# Patient Record
Sex: FEMALE | Race: WHITE | NOT HISPANIC OR LATINO | Employment: OTHER | URBAN - METROPOLITAN AREA
[De-identification: names, ages, dates, MRNs, and addresses within clinical notes are randomized per-mention and may not be internally consistent; named-entity substitution may affect disease eponyms.]

---

## 2023-07-12 ENCOUNTER — HOSPITAL ENCOUNTER (EMERGENCY)
Facility: CLINIC | Age: 83
Discharge: HOME | End: 2023-07-12
Attending: EMERGENCY MEDICINE | Admitting: EMERGENCY MEDICINE
Payer: COMMERCIAL

## 2023-07-12 VITALS
OXYGEN SATURATION: 93 % | HEART RATE: 50 BPM | HEIGHT: 65 IN | RESPIRATION RATE: 16 BRPM | BODY MASS INDEX: 30.49 KG/M2 | TEMPERATURE: 97.8 F | SYSTOLIC BLOOD PRESSURE: 175 MMHG | WEIGHT: 183 LBS | DIASTOLIC BLOOD PRESSURE: 92 MMHG

## 2023-07-12 DIAGNOSIS — I10 PRIMARY HYPERTENSION: ICD-10-CM

## 2023-07-12 DIAGNOSIS — R42 LIGHT HEADED: ICD-10-CM

## 2023-07-12 LAB
ANION GAP SERPL CALCULATED.3IONS-SCNC: 8 MMOL/L (ref 7–15)
BASOPHILS # BLD AUTO: 0 10E3/UL (ref 0–0.2)
BASOPHILS NFR BLD AUTO: 1 %
BUN SERPL-MCNC: 22.7 MG/DL (ref 8–23)
CALCIUM SERPL-MCNC: 9.2 MG/DL (ref 8.8–10.2)
CHLORIDE SERPL-SCNC: 108 MMOL/L (ref 98–107)
CREAT SERPL-MCNC: 0.78 MG/DL (ref 0.51–0.95)
DEPRECATED HCO3 PLAS-SCNC: 25 MMOL/L (ref 22–29)
EOSINOPHIL # BLD AUTO: 0.3 10E3/UL (ref 0–0.7)
EOSINOPHIL NFR BLD AUTO: 3 %
ERYTHROCYTE [DISTWIDTH] IN BLOOD BY AUTOMATED COUNT: 13.5 % (ref 10–15)
GFR SERPL CREATININE-BSD FRML MDRD: 76 ML/MIN/1.73M2
GLUCOSE BLDC GLUCOMTR-MCNC: 111 MG/DL (ref 70–99)
GLUCOSE SERPL-MCNC: 107 MG/DL (ref 70–99)
HCT VFR BLD AUTO: 38.8 % (ref 35–47)
HGB BLD-MCNC: 12.8 G/DL (ref 11.7–15.7)
HOLD SPECIMEN: NORMAL
IMM GRANULOCYTES # BLD: 0 10E3/UL
IMM GRANULOCYTES NFR BLD: 0 %
LYMPHOCYTES # BLD AUTO: 3 10E3/UL (ref 0.8–5.3)
LYMPHOCYTES NFR BLD AUTO: 39 %
MCH RBC QN AUTO: 30.4 PG (ref 26.5–33)
MCHC RBC AUTO-ENTMCNC: 33 G/DL (ref 31.5–36.5)
MCV RBC AUTO: 92 FL (ref 78–100)
MONOCYTES # BLD AUTO: 0.7 10E3/UL (ref 0–1.3)
MONOCYTES NFR BLD AUTO: 9 %
NEUTROPHILS # BLD AUTO: 3.6 10E3/UL (ref 1.6–8.3)
NEUTROPHILS NFR BLD AUTO: 48 %
NRBC # BLD AUTO: 0 10E3/UL
NRBC BLD AUTO-RTO: 0 /100
PLATELET # BLD AUTO: 153 10E3/UL (ref 150–450)
POTASSIUM SERPL-SCNC: 3.7 MMOL/L (ref 3.4–5.3)
RBC # BLD AUTO: 4.21 10E6/UL (ref 3.8–5.2)
SODIUM SERPL-SCNC: 141 MMOL/L (ref 136–145)
TROPONIN T SERPL HS-MCNC: 7 NG/L
TROPONIN T SERPL HS-MCNC: 9 NG/L
WBC # BLD AUTO: 7.6 10E3/UL (ref 4–11)

## 2023-07-12 PROCEDURE — 258N000003 HC RX IP 258 OP 636: Performed by: EMERGENCY MEDICINE

## 2023-07-12 PROCEDURE — 85014 HEMATOCRIT: CPT | Performed by: EMERGENCY MEDICINE

## 2023-07-12 PROCEDURE — 82962 GLUCOSE BLOOD TEST: CPT

## 2023-07-12 PROCEDURE — 84484 ASSAY OF TROPONIN QUANT: CPT | Performed by: EMERGENCY MEDICINE

## 2023-07-12 PROCEDURE — 93005 ELECTROCARDIOGRAM TRACING: CPT | Performed by: EMERGENCY MEDICINE

## 2023-07-12 PROCEDURE — 96360 HYDRATION IV INFUSION INIT: CPT | Performed by: EMERGENCY MEDICINE

## 2023-07-12 PROCEDURE — 36415 COLL VENOUS BLD VENIPUNCTURE: CPT | Performed by: EMERGENCY MEDICINE

## 2023-07-12 PROCEDURE — 99284 EMERGENCY DEPT VISIT MOD MDM: CPT | Mod: 25 | Performed by: EMERGENCY MEDICINE

## 2023-07-12 PROCEDURE — 80048 BASIC METABOLIC PNL TOTAL CA: CPT | Performed by: EMERGENCY MEDICINE

## 2023-07-12 PROCEDURE — 93010 ELECTROCARDIOGRAM REPORT: CPT | Performed by: EMERGENCY MEDICINE

## 2023-07-12 RX ADMIN — SODIUM CHLORIDE 500 ML: 9 INJECTION, SOLUTION INTRAVENOUS at 05:15

## 2023-07-12 ASSESSMENT — ACTIVITIES OF DAILY LIVING (ADL): ADLS_ACUITY_SCORE: 35

## 2023-07-12 NOTE — ED TRIAGE NOTES
"Pt reported she woke up \"not feeling well, weakness, and thirsty\". Pt took her BP and BP was sbp 200. Pt denied CP, SOB, hx of diabetes, HA, visual changes. Reports has been in good health prior to tonight. Has a hx of HTN and takes Metoprolol 100 mg daily.      Triage Assessment       Row Name 07/12/23 0316       Triage Assessment (Adult)    Airway WDL WDL       Respiratory WDL    Respiratory WDL WDL       Skin Circulation/Temperature WDL    Skin Circulation/Temperature WDL WDL       Cardiac WDL    Cardiac WDL WDL       Peripheral/Neurovascular WDL    Peripheral Neurovascular WDL WDL       Cognitive/Neuro/Behavioral WDL    Cognitive/Neuro/Behavioral WDL WDL                  "

## 2023-07-12 NOTE — DISCHARGE INSTRUCTIONS
Drink smallness of fluid frequently to maintain hydration.  Continue your home medications.  I am not sure what caused your symptoms overnight.  So further testing has all been reassuring.  Please follow-up with your regular doctor or return to the emergency department if you are having chest pain, difficulty breathing, worsening symptoms, or other concerns.

## 2023-07-12 NOTE — ED PROVIDER NOTES
"  History     Chief Complaint   Patient presents with    Hypertension     HPI  Antonia Gordon is a 83 year old female who presents for concerns of elevated blood pressure.  She says that she woke up early this morning and just did not feel right, she could not pinpoint it but just felt slightly lightheaded and weak when she was getting up.  She checked her blood pressure and found to be in the 200/100 range and so came here for further evaluation.  She denies any fever, headache, chest pain, shortness of breath, cough, abdominal pain, nausea, vomiting.  She has not take anything for her symptoms.  She has a history of hypertension.  She says she does not feel dizzy at this time.    Allergies:  Allergies   Allergen Reactions    Cephalexin Itching and Rash    Cephalosporins Rash       Problem List:    There are no problems to display for this patient.       Past Medical History:    Past Medical History:   Diagnosis Date    Hypertension        Past Surgical History:    Past Surgical History:   Procedure Laterality Date    D & C      1991 & 1998    GYN SURGERY      both ovaries removed 1991    ORTHOPEDIC SURGERY      right knee repair 1999       Family History:    No family history on file.    Social History:  Marital Status:   [2]        Medications:    CENTRUM SILVER OR TABS  CLOBETASOL PROPIONATE 0.05 % EX CREA  PREMARIN 0.625 MG/GM VA CREA  TOPROL XL# 100 MG OR TB24          Review of Systems    Physical Exam   BP: (!) 221/109  Pulse: 63  Temp: 97.8  F (36.6  C)  Resp: 18  Height: 165.1 cm (5' 5\")  Weight: 83 kg (183 lb)  SpO2: 96 %      Physical Exam  Vitals and nursing note reviewed.   Constitutional:       General: She is in acute distress.      Appearance: She is well-developed. She is not diaphoretic.   HENT:      Head: Normocephalic and atraumatic.      Right Ear: External ear normal.      Left Ear: External ear normal.      Nose: Nose normal.   Eyes:      General: No scleral icterus.     " Conjunctiva/sclera: Conjunctivae normal.   Cardiovascular:      Rate and Rhythm: Normal rate and regular rhythm.      Heart sounds: No murmur heard.  Pulmonary:      Effort: Pulmonary effort is normal. No respiratory distress.      Breath sounds: No stridor.   Musculoskeletal:      Cervical back: Normal range of motion.   Skin:     General: Skin is warm and dry.   Neurological:      Mental Status: She is alert and oriented to person, place, and time.      GCS: GCS eye subscore is 4. GCS verbal subscore is 5. GCS motor subscore is 6.      Cranial Nerves: Cranial nerves 2-12 are intact.      Coordination: Finger-Nose-Finger Test normal. Rapid alternating movements normal.      Gait: Gait normal.   Psychiatric:         Behavior: Behavior normal.         ED Course                 Procedures              EKG Interpretation:      Interpreted by Brayan Mancilla MD  Time reviewed: 0403  Symptoms at time of EKG: Weakness   Rhythm: sinus   Rate: 54  Axis: Left Axis Deviation  Ectopy: none  Conduction: left anterior fasciclar block  ST Segments/ T Waves: No acute ischemic changes  Q Waves: none  Comparison to prior: Unchanged    Clinical Impression: no acute changes    Critical Care time:  none               Results for orders placed or performed during the hospital encounter of 07/12/23 (from the past 24 hour(s))   Glucose by meter   Result Value Ref Range    GLUCOSE BY METER POCT 111 (H) 70 - 99 mg/dL   Troponin T, High Sensitivity   Result Value Ref Range    Troponin T, High Sensitivity 7 <=14 ng/L   CBC with Platelets & Differential    Narrative    The following orders were created for panel order CBC with Platelets & Differential.  Procedure                               Abnormality         Status                     ---------                               -----------         ------                     CBC with platelets and d...[[id number]]                      Final result                 Please view results for  these tests on the individual orders.   Basic metabolic panel   Result Value Ref Range    Sodium 141 136 - 145 mmol/L    Potassium 3.7 3.4 - 5.3 mmol/L    Chloride 108 (H) 98 - 107 mmol/L    Carbon Dioxide (CO2) 25 22 - 29 mmol/L    Anion Gap 8 7 - 15 mmol/L    Urea Nitrogen 22.7 8.0 - 23.0 mg/dL    Creatinine 0.78 0.51 - 0.95 mg/dL    Calcium 9.2 8.8 - 10.2 mg/dL    Glucose 107 (H) 70 - 99 mg/dL    GFR Estimate 76 >60 mL/min/1.73m2   CBC with platelets and differential   Result Value Ref Range    WBC Count 7.6 4.0 - 11.0 10e3/uL    RBC Count 4.21 3.80 - 5.20 10e6/uL    Hemoglobin 12.8 11.7 - 15.7 g/dL    Hematocrit 38.8 35.0 - 47.0 %    MCV 92 78 - 100 fL    MCH 30.4 26.5 - 33.0 pg    MCHC 33.0 31.5 - 36.5 g/dL    RDW 13.5 10.0 - 15.0 %    Platelet Count 153 150 - 450 10e3/uL    % Neutrophils 48 %    % Lymphocytes 39 %    % Monocytes 9 %    % Eosinophils 3 %    % Basophils 1 %    % Immature Granulocytes 0 %    NRBCs per 100 WBC 0 <1 /100    Absolute Neutrophils 3.6 1.6 - 8.3 10e3/uL    Absolute Lymphocytes 3.0 0.8 - 5.3 10e3/uL    Absolute Monocytes 0.7 0.0 - 1.3 10e3/uL    Absolute Eosinophils 0.3 0.0 - 0.7 10e3/uL    Absolute Basophils 0.0 0.0 - 0.2 10e3/uL    Absolute Immature Granulocytes 0.0 <=0.4 10e3/uL    Absolute NRBCs 0.0 10e3/uL   [facility]    Narrative    The following orders were created for panel order [facility].  Procedure                               Abnormality         Status                     ---------                               -----------         ------                     Extra Blue Top Tube[[id number]]                              Final result                 Please view results for these tests on the individual orders.   Extra Blue Top Tube   Result Value Ref Range    Hold Specimen JIC    Troponin T, High Sensitivity   Result Value Ref Range    Troponin T, High Sensitivity 9 <=14 ng/L       Medications   0.9% sodium chloride BOLUS (0 mLs Intravenous Stopped 7/12/23 0616)        Assessments & Plan (with Medical Decision Making)   83-year-old female who presents with feeling lightheaded and weak.  High risk complaint in this elderly woman.  Unclear cause of her symptoms.  EKG sinus rhythm without signs of ischemia or dysrhythmia.  Electrolytes within normal limits.  Troponin is normal and repeat troponin after several hours is also normal making ACS unlikely.  She has a normal neurologic examination.  Hospitalization considered given the high risk nature of this presentation, however after IV fluids patient said that she was feeling better.  She was ambulating without difficulty and is safe to discharge with reassurance and instructions to check her blood pressure several times over the next 2 to 3 weeks when she is at rest and calm and relaxed.  Write those numbers down and bring them to her primary doctor for further assessment.  The patient is in agreement with this plan.    I have reviewed the nursing notes.    I have reviewed the findings, diagnosis, plan and need for follow up with the patient.           Medical Decision Making  The patient's presentation was of high complexity (an acute health issue posing potential threat to life or bodily function).    The patient's evaluation involved:  ordering and/or review of 3+ test(s) in this encounter (see separate area of note for details)    The patient's management necessitated high risk (a decision regarding hospitalization).        Discharge Medication List as of 7/12/2023  6:16 AM          Final diagnoses:   Primary hypertension   Light headed       7/12/2023   [facility] EMERGENCY DEPT       Brayan Mancilla MD  07/12/23 0726

## 2023-07-23 ENCOUNTER — HOSPITAL ENCOUNTER (EMERGENCY)
Facility: CLINIC | Age: 83
Discharge: HOME | End: 2023-07-23
Attending: EMERGENCY MEDICINE | Admitting: EMERGENCY MEDICINE
Payer: COMMERCIAL

## 2023-07-23 ENCOUNTER — APPOINTMENT (OUTPATIENT)
Dept: CT IMAGING | Facility: CLINIC | Age: 83
End: 2023-07-23
Attending: EMERGENCY MEDICINE
Payer: COMMERCIAL

## 2023-07-23 VITALS
RESPIRATION RATE: 17 BRPM | WEIGHT: 182 LBS | HEART RATE: 53 BPM | BODY MASS INDEX: 30.32 KG/M2 | HEIGHT: 65 IN | OXYGEN SATURATION: 98 % | TEMPERATURE: 97.5 F | DIASTOLIC BLOOD PRESSURE: 99 MMHG | SYSTOLIC BLOOD PRESSURE: 180 MMHG

## 2023-07-23 DIAGNOSIS — R07.89 CHEST DISCOMFORT: ICD-10-CM

## 2023-07-23 DIAGNOSIS — I10 PRIMARY HYPERTENSION: ICD-10-CM

## 2023-07-23 DIAGNOSIS — R00.1 SINUS BRADYCARDIA: ICD-10-CM

## 2023-07-23 LAB
ALBUMIN SERPL BCG-MCNC: 3.9 G/DL (ref 3.5–5.2)
ALP SERPL-CCNC: 82 U/L (ref 35–104)
ALT SERPL W P-5'-P-CCNC: 20 U/L (ref 0–50)
ANION GAP SERPL CALCULATED.3IONS-SCNC: 11 MMOL/L (ref 7–15)
AST SERPL W P-5'-P-CCNC: 18 U/L (ref 0–45)
BASOPHILS # BLD AUTO: 0.1 10E3/UL (ref 0–0.2)
BASOPHILS NFR BLD AUTO: 1 %
BILIRUB SERPL-MCNC: 0.5 MG/DL
BUN SERPL-MCNC: 20.3 MG/DL (ref 8–23)
CALCIUM SERPL-MCNC: 9.3 MG/DL (ref 8.8–10.2)
CHLORIDE SERPL-SCNC: 106 MMOL/L (ref 98–107)
CREAT SERPL-MCNC: 0.89 MG/DL (ref 0.51–0.95)
DEPRECATED HCO3 PLAS-SCNC: 24 MMOL/L (ref 22–29)
EOSINOPHIL # BLD AUTO: 0.3 10E3/UL (ref 0–0.7)
EOSINOPHIL NFR BLD AUTO: 4 %
ERYTHROCYTE [DISTWIDTH] IN BLOOD BY AUTOMATED COUNT: 13.6 % (ref 10–15)
GFR SERPL CREATININE-BSD FRML MDRD: 65 ML/MIN/1.73M2
GLUCOSE SERPL-MCNC: 106 MG/DL (ref 70–99)
HCT VFR BLD AUTO: 43.2 % (ref 35–47)
HGB BLD-MCNC: 14 G/DL (ref 11.7–15.7)
HOLD SPECIMEN: NORMAL
IMM GRANULOCYTES # BLD: 0 10E3/UL
IMM GRANULOCYTES NFR BLD: 0 %
LYMPHOCYTES # BLD AUTO: 3.9 10E3/UL (ref 0.8–5.3)
LYMPHOCYTES NFR BLD AUTO: 44 %
MCH RBC QN AUTO: 30.5 PG (ref 26.5–33)
MCHC RBC AUTO-ENTMCNC: 32.4 G/DL (ref 31.5–36.5)
MCV RBC AUTO: 94 FL (ref 78–100)
MONOCYTES # BLD AUTO: 0.8 10E3/UL (ref 0–1.3)
MONOCYTES NFR BLD AUTO: 10 %
NEUTROPHILS # BLD AUTO: 3.6 10E3/UL (ref 1.6–8.3)
NEUTROPHILS NFR BLD AUTO: 41 %
NRBC # BLD AUTO: 0 10E3/UL
NRBC BLD AUTO-RTO: 0 /100
PLATELET # BLD AUTO: 169 10E3/UL (ref 150–450)
POTASSIUM SERPL-SCNC: 4.3 MMOL/L (ref 3.4–5.3)
PROT SERPL-MCNC: 6.7 G/DL (ref 6.4–8.3)
RBC # BLD AUTO: 4.59 10E6/UL (ref 3.8–5.2)
SODIUM SERPL-SCNC: 141 MMOL/L (ref 136–145)
TROPONIN T SERPL HS-MCNC: 6 NG/L
TROPONIN T SERPL HS-MCNC: 6 NG/L
WBC # BLD AUTO: 8.7 10E3/UL (ref 4–11)

## 2023-07-23 PROCEDURE — 85025 COMPLETE CBC W/AUTO DIFF WBC: CPT | Performed by: EMERGENCY MEDICINE

## 2023-07-23 PROCEDURE — 250N000011 HC RX IP 250 OP 636: Performed by: EMERGENCY MEDICINE

## 2023-07-23 PROCEDURE — 99284 EMERGENCY DEPT VISIT MOD MDM: CPT | Mod: 25 | Performed by: EMERGENCY MEDICINE

## 2023-07-23 PROCEDURE — 93010 ELECTROCARDIOGRAM REPORT: CPT | Performed by: EMERGENCY MEDICINE

## 2023-07-23 PROCEDURE — 36415 COLL VENOUS BLD VENIPUNCTURE: CPT | Performed by: EMERGENCY MEDICINE

## 2023-07-23 PROCEDURE — 93005 ELECTROCARDIOGRAM TRACING: CPT | Performed by: EMERGENCY MEDICINE

## 2023-07-23 PROCEDURE — 250N000013 HC RX MED GY IP 250 OP 250 PS 637: Performed by: EMERGENCY MEDICINE

## 2023-07-23 PROCEDURE — 80053 COMPREHEN METABOLIC PANEL: CPT | Performed by: EMERGENCY MEDICINE

## 2023-07-23 PROCEDURE — 96360 HYDRATION IV INFUSION INIT: CPT | Mod: 59 | Performed by: EMERGENCY MEDICINE

## 2023-07-23 PROCEDURE — 258N000003 HC RX IP 258 OP 636: Performed by: EMERGENCY MEDICINE

## 2023-07-23 PROCEDURE — 84484 ASSAY OF TROPONIN QUANT: CPT | Performed by: EMERGENCY MEDICINE

## 2023-07-23 PROCEDURE — 74177 CT ABD & PELVIS W/CONTRAST: CPT

## 2023-07-23 PROCEDURE — 96361 HYDRATE IV INFUSION ADD-ON: CPT | Performed by: EMERGENCY MEDICINE

## 2023-07-23 PROCEDURE — 84484 ASSAY OF TROPONIN QUANT: CPT | Mod: 91 | Performed by: EMERGENCY MEDICINE

## 2023-07-23 PROCEDURE — 99285 EMERGENCY DEPT VISIT HI MDM: CPT | Mod: 25 | Performed by: EMERGENCY MEDICINE

## 2023-07-23 PROCEDURE — 250N000009 HC RX 250: Performed by: EMERGENCY MEDICINE

## 2023-07-23 RX ORDER — IOPAMIDOL 755 MG/ML
72 INJECTION, SOLUTION INTRAVASCULAR ONCE
Status: COMPLETED | OUTPATIENT
Start: 2023-07-23 | End: 2023-07-23

## 2023-07-23 RX ORDER — LOSARTAN POTASSIUM 25 MG/1
25 TABLET ORAL ONCE
Status: COMPLETED | OUTPATIENT
Start: 2023-07-23 | End: 2023-07-23

## 2023-07-23 RX ADMIN — SODIUM CHLORIDE 500 ML: 9 INJECTION, SOLUTION INTRAVENOUS at 06:50

## 2023-07-23 RX ADMIN — SODIUM CHLORIDE 80 ML: 9 INJECTION, SOLUTION INTRAVENOUS at 06:35

## 2023-07-23 RX ADMIN — IOPAMIDOL 72 ML: 755 INJECTION, SOLUTION INTRAVENOUS at 06:36

## 2023-07-23 RX ADMIN — LOSARTAN POTASSIUM 25 MG: 25 TABLET, FILM COATED ORAL at 08:11

## 2023-07-23 ASSESSMENT — ACTIVITIES OF DAILY LIVING (ADL): ADLS_ACUITY_SCORE: 35

## 2023-07-23 ASSESSMENT — ENCOUNTER SYMPTOMS
HEMATOLOGIC/LYMPHATIC NEGATIVE: 1
CONSTITUTIONAL NEGATIVE: 1
MUSCULOSKELETAL NEGATIVE: 1
ALLERGIC/IMMUNOLOGIC NEGATIVE: 1
PSYCHIATRIC NEGATIVE: 1
EYES NEGATIVE: 1
ENDOCRINE NEGATIVE: 1
CHEST TIGHTNESS: 1
NEUROLOGICAL NEGATIVE: 1
GASTROINTESTINAL NEGATIVE: 1

## 2023-07-23 NOTE — DISCHARGE INSTRUCTIONS
1) Your evaluation today did not reveal any emergency condition or diagnosis based on symptoms you have reported prior to arrival.  Imaging did not show any vascular emergency as discussed and reviewed.  During your care blood work was reassuring without evidence of a heart attack.    2) We have reviewed current blood pressure medication plan and the need for follow-up care with your care team in 1 week for discussion about adjustments are required including timing of measuring her blood pressure.    3) Although you appear stable for discharge to home with plan for ongoing care as an outpatient.  If you develop new symptoms of concern as discussed and reviewed you should return to be reevaluated.

## 2023-07-23 NOTE — ED PROVIDER NOTES
History     Chief Complaint   Patient presents with    Hypertension    Chest Pain     HPI  Antonia Gordon is a 83 year old female who presents with report of chest discomfort after waking this morning.  On intake patient noted that her pain was about the left chest rating down the left shoulder.  She reports a history of hypertension.    Medical records show she had been evaluated on July 12, 2023 with lightheadedness and concerned about her blood pressure.  She was subsequently followed up in clinic on 7/12/2023 and on 7/19/2023.    On examination patient was accompanied by her spouse Jose Luis from home in [place].  She reports she recently had right cataract surgery 2 days ago which went well.  She confirmed her visit 11 days ago and noted that her losartan was increased from 12.5 mg daily to 25 mg daily over the last 4 days after starting it after her ED visit on 7/12/2023.  Has been on metoprolol 100 mg daily for quite some time.  She reported that she woke up at 4 AM this morning with left-sided chest pain rating down the left arm.  She describes it as a discomfort and aching twinging feeling unwell.  She admits that she checks her blood pressure every morning has been checking it more frequently since her blood pressure has been noted to be high.  She reports no back pain or flank pain.  No headache and no visual symptoms.  Due to her symptoms and recent blood pressure concerns she presents by car for further assessment and care.    Allergies:  Allergies   Allergen Reactions    Cephalexin Itching and Rash    Cephalosporins Rash       Problem List:    There are no problems to display for this patient.       Past Medical History:    Past Medical History:   Diagnosis Date    Hypertension        Past Surgical History:    Past Surgical History:   Procedure Laterality Date    D & C      1991 & 1998    GYN SURGERY      both ovaries removed 1991    ORTHOPEDIC SURGERY      right knee repair 1999       Family  "History:    No family history on file.    Social History:  Marital Status:   [2]        Medications:    CENTRUM SILVER OR TABS  CLOBETASOL PROPIONATE 0.05 % EX CREA  PREMARIN 0.625 MG/GM VA CREA  TOPROL XL# 100 MG OR TB24          Review of Systems   Constitutional: Negative.    HENT: Negative.     Eyes: Negative.    Respiratory:  Positive for chest tightness.    Cardiovascular:  Positive for chest pain.   Gastrointestinal: Negative.    Endocrine: Negative.    Genitourinary: Negative.    Musculoskeletal: Negative.    Skin: Negative.    Allergic/Immunologic: Negative.    Neurological: Negative.    Hematological: Negative.    Psychiatric/Behavioral: Negative.     All other systems reviewed and are negative.      Physical Exam   BP: (!) 202/88  Pulse: 56  Temp: 97.5  F (36.4  C)  Resp: 18  Height: 165.1 cm (5' 5\")  Weight: 82.6 kg (182 lb)  SpO2: 98 %      Physical Exam  HENT:      Head: Normocephalic and atraumatic.   Eyes:      Extraocular Movements: Extraocular movements intact.      Pupils: Pupils are equal, round, and reactive to light.   Cardiovascular:      Rate and Rhythm: Normal rate and regular rhythm.      Heart sounds:      No systolic murmur is present.   Chest:      Chest wall: No mass, deformity, tenderness, crepitus or edema. There is no dullness to percussion.   Abdominal:      General: There is no abdominal bruit.      Palpations: Abdomen is soft. There is no fluid wave, splenomegaly or mass.      Tenderness: There is no abdominal tenderness. There is no guarding or rebound.   Musculoskeletal:         General: Normal range of motion.      Cervical back: Normal range of motion and neck supple.   Skin:     Capillary Refill: Capillary refill takes less than 2 seconds.      Coloration: Skin is not cyanotic or pale.      Findings: No ecchymosis, erythema or rash.      Nails: There is no clubbing.   Neurological:      General: No focal deficit present.      Mental Status: She is alert and oriented to " person, place, and time.   Psychiatric:         Mood and Affect: Mood normal. Mood is not anxious.         Behavior: Behavior is agitated.         ED Course                 Procedures              EKG Interpretation:      Interpreted by Fredis Clark MD  Time reviewed: 0604  Symptoms at time of EKG: left sided chest discomfort   Rhythm: sinus bradycardia  Rate: Bradycardia  Axis: Left Axis Deviation  Ectopy: none  Conduction: left anterior fasciclar block  ST Segments/ T Waves: Non-specific ST-T wave changes  Q Waves: nonspecific  Comparison to prior: When compared with EKG dated 7/12/2023 no acute changes noted.-    Clinical Impression: no acute changes        Critical Care time:  was 30 minutes for this patient excluding procedures.             ED medications:  Medications   0.9% sodium chloride BOLUS (500 mLs Intravenous $New Bag 7/23/23 0650)   iopamidol (ISOVUE-370) solution 72 mL (72 mLs Intravenous $Given 7/23/23 0636)   sodium chloride 0.9 % bag 500mL for CT scan flush use (80 mLs Intravenous $Given 7/23/23 0635)   losartan (COZAAR) tablet 25 mg (25 mg Oral $Given 7/23/23 0811)          ED Vitals:  Vitals:    07/23/23 0700 07/23/23 0730 07/23/23 0800 07/23/23 0815   BP: (!) 172/79 (!) 164/83 (!) 167/79 (!) 172/90   Pulse: 54 52 53 57   Resp: 10 19 (!) 8 12   Temp:       TempSrc:       SpO2: 96% 98% 97% 96%   Weight:       Height:         Vitals:    07/23/23 0730 07/23/23 0800 07/23/23 0815 07/23/23 0840   BP: (!) 164/83 (!) 167/79 (!) 172/90 (!) 180/99   Pulse: 52 53 57 53   Resp: 19 (!) 8 12 17   Temp:       TempSrc:       SpO2: 98% 97% 96% 98%   Weight:       Height:          ED labs and imaging:  Results for orders placed or performed during the hospital encounter of 07/23/23 (from the past 24 hour(s))   [place] Draw    Narrative    The following orders were created for panel order [place] Draw.  Procedure                               Abnormality         Status                     ---------                                -----------         ------                     Extra Blue Top Tube[[id number]]                              Final result               Extra Red Top Tube[[id number]]                               Final result               Extra Green Top (Lithium...[[id number]]                      Final result               Extra Purple Top Tube[[id number]]                            Final result                 Please view results for these tests on the individual orders.   Extra Blue Top Tube   Result Value Ref Range    Hold Specimen JIC    Extra Red Top Tube   Result Value Ref Range    Hold Specimen JIC    Extra Green Top (Lithium Heparin) Tube   Result Value Ref Range    Hold Specimen JIC    Extra Purple Top Tube   Result Value Ref Range    Hold Specimen JIC    CBC with platelets differential    Narrative    The following orders were created for panel order CBC with platelets differential.  Procedure                               Abnormality         Status                     ---------                               -----------         ------                     CBC with platelets and d...[[id number]]                      Final result                 Please view results for these tests on the individual orders.   Comprehensive metabolic panel   Result Value Ref Range    Sodium 141 136 - 145 mmol/L    Potassium 4.3 3.4 - 5.3 mmol/L    Chloride 106 98 - 107 mmol/L    Carbon Dioxide (CO2) 24 22 - 29 mmol/L    Anion Gap 11 7 - 15 mmol/L    Urea Nitrogen 20.3 8.0 - 23.0 mg/dL    Creatinine 0.89 0.51 - 0.95 mg/dL    Calcium 9.3 8.8 - 10.2 mg/dL    Glucose 106 (H) 70 - 99 mg/dL    Alkaline Phosphatase 82 35 - 104 U/L    AST 18 0 - 45 U/L    ALT 20 0 - 50 U/L    Protein Total 6.7 6.4 - 8.3 g/dL    Albumin 3.9 3.5 - 5.2 g/dL    Bilirubin Total 0.5 <=1.2 mg/dL    GFR Estimate 65 >60 mL/min/1.73m2   Troponin T, High Sensitivity   Result Value Ref Range    Troponin T, High Sensitivity 6 <=14 ng/L   CBC with platelets  and differential   Result Value Ref Range    WBC Count 8.7 4.0 - 11.0 10e3/uL    RBC Count 4.59 3.80 - 5.20 10e6/uL    Hemoglobin 14.0 11.7 - 15.7 g/dL    Hematocrit 43.2 35.0 - 47.0 %    MCV 94 78 - 100 fL    MCH 30.5 26.5 - 33.0 pg    MCHC 32.4 31.5 - 36.5 g/dL    RDW 13.6 10.0 - 15.0 %    Platelet Count 169 150 - 450 10e3/uL    % Neutrophils 41 %    % Lymphocytes 44 %    % Monocytes 10 %    % Eosinophils 4 %    % Basophils 1 %    % Immature Granulocytes 0 %    NRBCs per 100 WBC 0 <1 /100    Absolute Neutrophils 3.6 1.6 - 8.3 10e3/uL    Absolute Lymphocytes 3.9 0.8 - 5.3 10e3/uL    Absolute Monocytes 0.8 0.0 - 1.3 10e3/uL    Absolute Eosinophils 0.3 0.0 - 0.7 10e3/uL    Absolute Basophils 0.1 0.0 - 0.2 10e3/uL    Absolute Immature Granulocytes 0.0 <=0.4 10e3/uL    Absolute NRBCs 0.0 10e3/uL   CT Aortic Survey w Contrast    Narrative    EXAM: CT AORTIC SURVEY W CONTRAST  LOCATION: [facility]  DATE: 7/23/2023    INDICATION: Advanced age, uncontrolled hypertension, acute left sided chest pain, feeling unwell. Evaluate for aortic dissection versus other acute vascular process  COMPARISON: None.  TECHNIQUE: CT angiogram chest abdomen pelvis during arterial phase of injection of IV contrast. 2D and 3D MIP reconstructions were performed by the CT technologist. Dose reduction techniques were used.   CONTRAST: 72 mL Isovue 370    FINDINGS:   CT ANGIOGRAM CHEST, ABDOMEN, AND PELVIS: No aortic wall hematoma, aneurysm or dissection. Celiac, SMA, single left/2 right renal arteries, FREDDY and bilateral iliofemoral arteries are patent with only minimal atherosclerotic calcification in the infrarenal   abdominal aorta. No incidental pulmonary embolus identified. Replaced right hepatic artery noted arising from the SMA.    LUNGS AND PLEURA: Scattered mild atelectasis in the lung bases. No consolidation, pleural effusion, pneumothorax or suspicious pulmonary nodules.    MEDIASTINUM/AXILLAE: No  intrathoracic, axillary or supraclavicular lymphadenopathy. Heart size is normal without pericardial effusion. A small hiatal hernia is present.    CORONARY ARTERY CALCIFICATION: Mild atherosclerotic calcification in the left anterior descending and right coronary arteries.    HEPATOBILIARY: Normal.    PANCREAS: Normal.    SPLEEN: Normal.    ADRENAL GLANDS: Normal.    KIDNEYS/BLADDER: Normal.    BOWEL: A few scattered sigmoid diverticula are present without acute sigmoid diverticulitis. Multiple tiny diverticula are also seen in the jejunum in the left hemiabdomen, with mild fat stranding in the left abdominal mesentery in the vicinity of these   diverticula, suspicious for mild jejunal diverticulitis (series 9 image 75). No adjacent mesenteric fluid collections. Appendix is normal. No bowel obstruction.    LYMPH NODES: No lymphadenopathy by size criteria. Multiple subcentimeter lymph nodes in the left hemiabdomen are likely reactive.    PELVIC ORGANS: Normal.    MUSCULOSKELETAL: Mild multilevel degenerative disc space narrowing in the mid and lower thoracic spine and minimal degenerative anterolisthesis of L4 on L5. No suspicious osseous lesions or acute fractures.        Impression    IMPRESSION:    1.  No acute vascular abnormalities including aortic wall hematoma, aneurysm or dissection.    2.  Mild, uncomplicated jejunal diverticulitis in the left mid abdomen.    3.  Mild coronary artery atherosclerotic calcifications.    4.  Small hiatal hernia and mild sigmoid diverticulosis.     Troponin T, High Sensitivity   Result Value Ref Range    Troponin T, High Sensitivity 6 <=14 ng/L       Assessments & Plan (with Medical Decision Making)   Assessment Summary and Clinical Impression: 83-year-old female with a history of hypertension presenting with sudden onset of chest discomfort rating to the left arm after awakening by report on arrival.  Patient was captured to have a blood pressure of 202/88.  Patient was  evaluated 11 days prior with hypertension and lightheadedness.  Records show she has been prescribed metoprolol 100 mg daily, recently on 25 mg daily of losartan.  Arrival EKG revealed sinus bradycardia which is unchanged from prior evaluation in the last 11 days.  Office visit for ED follow-up for hypertension with initiation of losartan and a recent dose increase in the last 4 to 5 days.  On exam she reported discomfort in the left chest after waking from sleep at 4 AM.  She would not describe it as a pain or pressure but reported to radiate down her left arm.  She reported some neck ache.  She admitted that she had been checking her blood pressure more consistently during the mornings and that she takes her antihypertensives in the morning.  She did not take her medications prior to ED arrival.  On examination blood pressure was 171/78.  She was resting comfortably and had no neurologic deficits.  We discussed my suspicion about uncontrolled hypertension however imaging and work-up was undertaken to exclude emergent causes that would explain her chest discomfort and symptoms reported on arrival.  CT aortic survey revealed no acute aortic or vascular emergency or process.  Additional findings were outlined in the radiology report that did not correspond to patient's history on presentation and clinical exam serially.  After period of care, discussion with patient and spouse expressed comfort going home with plan for watchful waiting with low threshold to return for evaluation and follow-up care with primary care team.    ED course and plan:  Reviewed the medical record.  Reviewed ED visit on 7/12/2023-improved with IV fluids but no therapies.  Outpatient follow-up was advised.  Office visit on 7/12 and 7/18/2023.  Work-up initiated on arrival revealed a normal troponin-obtained within 2 hours of symptom onset as reported, follow-up troponin from arrival remained normal. Normal hemogram.  Records show patient had  normal TSH on 5/23/2023.  Normal troponin and renal function 7/12/2023.  She was monitored with frequent vital signs on telemetry.  CT aortic survey revealed no acute vascular abnormality.  Radiology noted suspicion for mild uncomplicated jejunal diverticulitis in the left mid abdomen.  Mild coronary artery atherosclerotic calcifications were noted and a small hiatal hernia.  See additional details outlined in the radiology report. Patient reported no abdominal pain and suspicion about acute diverticulitis on serial clinical exam is low during  ED course, No empiric treatment for diverticulitis initiated.  She was offered her home dose of losartan during her ED course her metoprolol was held given resting sinus bradycardia although not new.  We discussed that the exact cause of her symptoms prearrival is not clear and that her work-up did not reveal an emergency condition or diagnosis.  We discussed follow-up care with upcoming visit in the clinic for medication management and recheck on 7/21/2023.  We discussed that if she develops new concerns including recurrence of chest pain that persists or additional symptoms she should return to be reevaluated.  Patient and spouse present during the ED course expressed understanding and agreement with plan of care.      Disclaimer: This note consists of symbols derived from keyboarding, dictation and/or voice recognition software. As a result, there may be errors in the script that have gone undetected. Please consider this when interpreting information found in this chart.   I have reviewed the nursing notes.    I have reviewed the findings, diagnosis, plan and need for follow up with the patient.           Medical Decision Making  The patient's presentation was of moderate complexity (an acute illness with systemic symptoms).    The patient's evaluation involved:  ordering and/or review of 2 test(s) in this encounter (diagnostic imaging and labs)    The patient's  management necessitated moderate risk (prescription drug management including medications given in the ED).        New Prescriptions    No medications on file       Final diagnoses:   Sinus bradycardia   Primary hypertension   Chest discomfort       7/23/2023   [facility] EMERGENCY DEPT       Fredis Clark MD  07/23/23 7028

## 2023-07-23 NOTE — ED TRIAGE NOTES
Pt presents with HTN and pain to left chest and up into left shoulder that started this am after waking.      Triage Assessment       Row Name 07/23/23 0536       Triage Assessment (Adult)    Airway WDL WDL       Respiratory WDL    Respiratory WDL WDL       Skin Circulation/Temperature WDL    Skin Circulation/Temperature WDL WDL       Cardiac WDL    Cardiac WDL X;chest pain       Chest Pain Assessment    Chest Pain Location anterior chest, left;shoulder, left    Chest Pain Radiation arm;back    Character aching;spasm    Chest Pain Intervention cardiac biomarkers drawn;cardiac monitoring continued;cardiac monitor placed;12-lead ECG obtained       Peripheral/Neurovascular WDL    Peripheral Neurovascular WDL WDL       Cognitive/Neuro/Behavioral WDL    Cognitive/Neuro/Behavioral WDL WDL

## 2023-08-29 ENCOUNTER — OFFICE VISIT (OUTPATIENT)
Dept: URGENT CARE | Facility: CLINIC | Age: 83
End: 2023-08-29
Payer: COMMERCIAL

## 2023-08-29 VITALS
HEART RATE: 81 BPM | SYSTOLIC BLOOD PRESSURE: 138 MMHG | OXYGEN SATURATION: 95 % | RESPIRATION RATE: 16 BRPM | DIASTOLIC BLOOD PRESSURE: 82 MMHG

## 2023-08-29 DIAGNOSIS — R30.0 DYSURIA: Primary | ICD-10-CM

## 2023-08-29 DIAGNOSIS — N30.00 ACUTE CYSTITIS WITHOUT HEMATURIA: ICD-10-CM

## 2023-08-29 PROBLEM — J45.901 REACTIVE AIRWAY DISEASE WITH ACUTE EXACERBATION: Status: ACTIVE | Noted: 2023-05-23

## 2023-08-29 PROBLEM — R73.03 PREDIABETES: Status: ACTIVE | Noted: 2022-03-28

## 2023-08-29 LAB
BACTERIA #/AREA URNS HPF: ABNORMAL /HPF
BILIRUB UR QL: NEGATIVE
CLARITY UR: CLEAR
COLOR UR: YELLOW
GLUCOSE UR QL: NEGATIVE MG/DL
HGB UR QL: ABNORMAL
KETONES UR-MCNC: NEGATIVE MG/DL
LEUKOCYTE ESTERASE UR QL STRIP: ABNORMAL
NITRITE UR QL: NEGATIVE
PH UR: 5.5 PH
PROT UR STRIP-MCNC: NEGATIVE MG/DL
RBC #/AREA URNS HPF: ABNORMAL /HPF
SP GR UR: 1.02 (ref 1–1.03)
SQUAMOUS #/AREA URNS HPF: ABNORMAL /HPF
UROBILINOGEN UR-MCNC: 0.2 MG/DL
WBC #/AREA URNS HPF: ABNORMAL /HPF

## 2023-08-29 PROCEDURE — 99203 OFFICE O/P NEW LOW 30 MIN: CPT | Mod: GF | Performed by: NURSE PRACTITIONER

## 2023-08-29 PROCEDURE — G0463 HOSPITAL OUTPT CLINIC VISIT: HCPCS

## 2023-08-29 PROCEDURE — 99202 OFFICE O/P NEW SF 15 MIN: CPT | Mod: GF | Performed by: NURSE PRACTITIONER

## 2023-08-29 PROCEDURE — 87088 URINE BACTERIA CULTURE: CPT | Performed by: NURSE PRACTITIONER

## 2023-08-29 PROCEDURE — 81001 URINALYSIS AUTO W/SCOPE: CPT | Performed by: NURSE PRACTITIONER

## 2023-08-29 RX ORDER — LOSARTAN POTASSIUM 25 MG/1
25 TABLET ORAL DAILY
COMMUNITY
Start: 2023-08-18

## 2023-08-29 RX ORDER — KETOCONAZOLE 20 MG/G
CREAM TOPICAL
COMMUNITY
Start: 2023-05-23

## 2023-08-29 RX ORDER — NITROFURANTOIN 25; 75 MG/1; MG/1
100 CAPSULE ORAL 2 TIMES DAILY
Qty: 10 CAPSULE | Refills: 0 | Status: SHIPPED | OUTPATIENT
Start: 2023-08-29 | End: 2023-09-03

## 2023-08-29 RX ORDER — METOPROLOL SUCCINATE 100 MG/1
100 TABLET, EXTENDED RELEASE ORAL DAILY
COMMUNITY
Start: 2023-06-15

## 2023-08-29 ASSESSMENT — ENCOUNTER SYMPTOMS
GASTROINTESTINAL NEGATIVE: 1
HEMATURIA: 0
FREQUENCY: 1
MUSCULOSKELETAL NEGATIVE: 1
DIFFICULTY URINATING: 0
CONSTITUTIONAL NEGATIVE: 1
RESPIRATORY NEGATIVE: 1
FLANK PAIN: 0
DYSURIA: 0
NEUROLOGICAL NEGATIVE: 1

## 2023-08-29 NOTE — PATIENT INSTRUCTIONS
OK to take over the counter AZO for symptoms. Please return to clinic with any fever, chills, nausea, vomiting, abdominal pain or any other worsening.

## 2023-08-29 NOTE — PROGRESS NOTES
Subjective      Inga Griffin is a 83 y.o. female who presents for urinary frequency and urgency.  No chief complaint on file.        HPI    Inga is an 83-year-old female from [place] who presents to the walk-in clinic with urinary frequency and urgency.  She states that her symptoms started on 8/23/2023.  She states that she has been pushing fluids to relieve her symptoms but they have not gone away.  She denies any fever or chills.  She denies any abdominal or back pain.  She denies any burning with urination.  She denies any nausea, vomiting or diarrhea.  She has not taken any medications for her symptoms.    The following have been reviewed and updated as appropriate in this visit:   Allergies  Meds  Problems  Med Hx  Surg Hx  Fam Hx         Allergies   Allergen Reactions    Cephalosporins Itching and Rash     Other reaction(s): Fever  Other reaction(s): Itching  Upper eye lids were swollen.   Cephalexin.       Current Outpatient Medications   Medication Sig Dispense Refill    losartan (COZAAR) 25 mg tablet Take 1 tablet (25 mg total) by mouth daily      metoprolol succinate XL (TOPROL-XL) 100 mg 24 hr tablet Take 1 tablet (100 mg total) by mouth daily      ketoconazole (NIZORAL) 2 % cream Apply a thin layer to affected skin twice a day as needed for rash in the skin folds.      nitrofurantoin, macrocrystal-monohydrate, (Macrobid) 100 mg capsule Take 1 capsule (100 mg total) by mouth 2 (two) times a day for 5 days 10 capsule 0     No current facility-administered medications for this visit.     History reviewed. No pertinent past medical history.  History reviewed. No pertinent surgical history.  History reviewed. No pertinent family history.  Social History     Socioeconomic History    Marital status:        Review of Systems   Constitutional: Negative.    Respiratory: Negative.     Gastrointestinal: Negative.    Genitourinary:  Positive for decreased urine volume, frequency and urgency.  Negative for difficulty urinating, dysuria, flank pain and hematuria.   Musculoskeletal: Negative.    Skin: Negative.    Neurological: Negative.        Objective   /82   Pulse 81   Resp 16   SpO2 95%     Physical Exam  Constitutional:       Appearance: Normal appearance.   Cardiovascular:      Rate and Rhythm: Normal rate and regular rhythm.      Pulses: Normal pulses.      Heart sounds: Normal heart sounds.   Pulmonary:      Effort: Pulmonary effort is normal.      Breath sounds: Normal breath sounds.   Abdominal:      General: Abdomen is flat. Bowel sounds are normal. There is no distension.      Palpations: Abdomen is soft. There is no mass.      Tenderness: There is no abdominal tenderness. There is no right CVA tenderness, left CVA tenderness, guarding or rebound.      Hernia: No hernia is present.   Musculoskeletal:         General: Normal range of motion.   Skin:     General: Skin is warm and dry.      Capillary Refill: Capillary refill takes less than 2 seconds.   Neurological:      General: No focal deficit present.      Mental Status: She is alert and oriented to person, place, and time.         Assessment/Plan   Diagnoses and all orders for this visit:    Dysuria  -     Urinalysis w/microscopic, reflex culture Urine, Clean Catch  -     Urine culture    Acute cystitis without hematuria  -     nitrofurantoin, macrocrystal-monohydrate, (Macrobid) 100 mg capsule; Take 1 capsule (100 mg total) by mouth 2 (two) times a day for 5 days       Inga is an 83-year-old female from [place] who presents to the walk-in clinic with a 1 week history of urinary frequency and urgency.  She denies any fever, chills, abdominal pain, back pain, nausea or vomiting.  Upon examination she does not exhibit any abdominal tenderness or CVA tenderness.   Work-up in the in clinic included a UA.  This was positive for trace blood, small leukocytes, 3-5 RBCs and 10-14 WBCs.  Urine culture is pending.   Ideally this patient  could be treated with Keflex, however the patient is allergic to cephalosporins.  Patient placed on Macrobid to take 1 capsule by mouth twice a day for the next 5 days.  Encouraged patient to push fluids.  Discussed the patient using OTC AZO for up to 2 days as needed for symptoms.   Instructed patient to return to the clinic or go to the nearest emergency room should she develop any fever, chills, abdominal pain, back pain, nausea, vomiting, diarrhea or any other worsening symptoms or if she has any concerns.  Patient verbalized understanding.    Rustam Carson, CNP

## 2023-08-30 NOTE — RESULT ENCOUNTER NOTE
Please call the patient regarding her urine culture result.    Your urine culture was positive for Klebsiella pneumonia.  The medication I prescribed, Macrobid, does not cover this bacteria.  You will need a new prescription for this bacteria.    Please let us know where you can  a new prescription.  I would recommend starting Augmentin 500 mg twice daily for 5 days, and quit taking the Macrobid.

## 2023-08-31 LAB
BACTERIA UR CULT: ABNORMAL
BACTERIA UR CULT: ABNORMAL

## 2023-10-02 ENCOUNTER — TRANSCRIBE ORDERS (OUTPATIENT)
Dept: OTHER | Age: 83
End: 2023-10-02

## 2023-10-02 DIAGNOSIS — I25.10 CAD, MULTIPLE VESSEL: ICD-10-CM

## 2023-10-02 DIAGNOSIS — R94.39 ABNORMAL STRESS TEST: Primary | ICD-10-CM

## 2023-10-13 ENCOUNTER — TRANSCRIBE ORDERS (OUTPATIENT)
Dept: OTHER | Age: 83
End: 2023-10-13

## 2023-10-17 ENCOUNTER — HOSPITAL ENCOUNTER (OUTPATIENT)
Dept: CARDIAC REHAB | Facility: CLINIC | Age: 83
Discharge: HOME | End: 2023-10-17
Attending: INTERNAL MEDICINE
Payer: COMMERCIAL

## 2023-10-17 PROCEDURE — 93798 PHYS/QHP OP CAR RHAB W/ECG: CPT

## 2023-10-17 PROCEDURE — 93797 PHYS/QHP OP CAR RHAB WO ECG: CPT | Mod: 59

## 2023-10-24 ENCOUNTER — HOSPITAL ENCOUNTER (OUTPATIENT)
Dept: CARDIAC REHAB | Facility: CLINIC | Age: 83
Discharge: HOME | End: 2023-10-24
Attending: INTERNAL MEDICINE
Payer: COMMERCIAL

## 2023-10-24 PROCEDURE — 93798 PHYS/QHP OP CAR RHAB W/ECG: CPT

## 2023-10-26 ENCOUNTER — HOSPITAL ENCOUNTER (OUTPATIENT)
Dept: CARDIAC REHAB | Facility: CLINIC | Age: 83
Discharge: HOME | End: 2023-10-26
Attending: INTERNAL MEDICINE
Payer: COMMERCIAL

## 2023-10-26 PROCEDURE — 93798 PHYS/QHP OP CAR RHAB W/ECG: CPT

## 2023-10-31 ENCOUNTER — HOSPITAL ENCOUNTER (OUTPATIENT)
Dept: CARDIAC REHAB | Facility: CLINIC | Age: 83
Discharge: HOME | End: 2023-10-31
Attending: INTERNAL MEDICINE
Payer: COMMERCIAL

## 2023-10-31 PROCEDURE — 93798 PHYS/QHP OP CAR RHAB W/ECG: CPT

## 2023-12-02 ENCOUNTER — HEALTH MAINTENANCE LETTER (OUTPATIENT)
Age: 83
End: 2023-12-02

## 2025-01-05 ENCOUNTER — HEALTH MAINTENANCE LETTER (OUTPATIENT)
Age: 85
End: 2025-01-05

## 2025-01-20 ENCOUNTER — ANESTHESIA EVENT (OUTPATIENT)
Dept: SURGERY | Facility: CLINIC | Age: 85
End: 2025-01-20
Payer: COMMERCIAL

## 2025-01-22 ENCOUNTER — HOSPITAL ENCOUNTER (OUTPATIENT)
Facility: CLINIC | Age: 85
Discharge: HOME | End: 2025-01-23
Attending: ORTHOPAEDIC SURGERY | Admitting: ORTHOPAEDIC SURGERY
Payer: COMMERCIAL

## 2025-01-22 ENCOUNTER — ANESTHESIA (OUTPATIENT)
Dept: SURGERY | Facility: CLINIC | Age: 85
End: 2025-01-22
Payer: COMMERCIAL

## 2025-01-22 ENCOUNTER — APPOINTMENT (OUTPATIENT)
Dept: GENERAL RADIOLOGY | Facility: CLINIC | Age: 85
End: 2025-01-22
Attending: ORTHOPAEDIC SURGERY
Payer: COMMERCIAL

## 2025-01-22 DIAGNOSIS — Z96.652 H/O TOTAL KNEE REPLACEMENT, LEFT: Primary | ICD-10-CM

## 2025-01-22 PROBLEM — M17.12 LEFT KNEE DJD: Status: ACTIVE | Noted: 2025-01-22

## 2025-01-22 LAB
CREAT SERPL-MCNC: 0.85 MG/DL (ref 0.51–0.95)
EGFRCR SERPLBLD CKD-EPI 2021: 68 ML/MIN/1.73M2
ERYTHROCYTE [DISTWIDTH] IN BLOOD BY AUTOMATED COUNT: 13.2 % (ref 10–15)
GLUCOSE BLDC GLUCOMTR-MCNC: 99 MG/DL (ref 70–99)
HCT VFR BLD AUTO: 39 % (ref 35–47)
HGB BLD-MCNC: 13.2 G/DL (ref 11.7–15.7)
MCH RBC QN AUTO: 30.6 PG (ref 26.5–33)
MCHC RBC AUTO-ENTMCNC: 33.8 G/DL (ref 31.5–36.5)
MCV RBC AUTO: 91 FL (ref 78–100)
PLATELET # BLD AUTO: 162 10E3/UL (ref 150–450)
POTASSIUM SERPL-SCNC: 4.1 MMOL/L (ref 3.4–5.3)
RBC # BLD AUTO: 4.31 10E6/UL (ref 3.8–5.2)
WBC # BLD AUTO: 7.8 10E3/UL (ref 4–11)

## 2025-01-22 PROCEDURE — 250N000013 HC RX MED GY IP 250 OP 250 PS 637

## 2025-01-22 PROCEDURE — 82962 GLUCOSE BLOOD TEST: CPT

## 2025-01-22 PROCEDURE — 82565 ASSAY OF CREATININE: CPT | Performed by: PHYSICIAN ASSISTANT

## 2025-01-22 PROCEDURE — 250N000013 HC RX MED GY IP 250 OP 250 PS 637: Performed by: ORTHOPAEDIC SURGERY

## 2025-01-22 PROCEDURE — 999N000065 XR KNEE PORT LEFT 1/2 VIEWS: Mod: LT

## 2025-01-22 PROCEDURE — C1776 JOINT DEVICE (IMPLANTABLE): HCPCS | Performed by: ORTHOPAEDIC SURGERY

## 2025-01-22 PROCEDURE — 250N000013 HC RX MED GY IP 250 OP 250 PS 637: Performed by: PHYSICIAN ASSISTANT

## 2025-01-22 PROCEDURE — 85018 HEMOGLOBIN: CPT | Performed by: PHYSICIAN ASSISTANT

## 2025-01-22 PROCEDURE — 370N000017 HC ANESTHESIA TECHNICAL FEE, PER MIN: Performed by: ORTHOPAEDIC SURGERY

## 2025-01-22 PROCEDURE — 999N000141 HC STATISTIC PRE-PROCEDURE NURSING ASSESSMENT: Performed by: ORTHOPAEDIC SURGERY

## 2025-01-22 PROCEDURE — 710N000009 HC RECOVERY PHASE 1, LEVEL 1, PER MIN: Performed by: ORTHOPAEDIC SURGERY

## 2025-01-22 PROCEDURE — 84132 ASSAY OF SERUM POTASSIUM: CPT | Performed by: PHYSICIAN ASSISTANT

## 2025-01-22 PROCEDURE — 272N000001 HC OR GENERAL SUPPLY STERILE: Performed by: ORTHOPAEDIC SURGERY

## 2025-01-22 PROCEDURE — 250N000009 HC RX 250: Performed by: NURSE ANESTHETIST, CERTIFIED REGISTERED

## 2025-01-22 PROCEDURE — 271N000001 HC OR GENERAL SUPPLY NON-STERILE: Performed by: ORTHOPAEDIC SURGERY

## 2025-01-22 PROCEDURE — 258N000003 HC RX IP 258 OP 636: Performed by: ORTHOPAEDIC SURGERY

## 2025-01-22 PROCEDURE — 85048 AUTOMATED LEUKOCYTE COUNT: CPT | Performed by: PHYSICIAN ASSISTANT

## 2025-01-22 PROCEDURE — 250N000011 HC RX IP 250 OP 636: Performed by: NURSE ANESTHETIST, CERTIFIED REGISTERED

## 2025-01-22 PROCEDURE — 258N000003 HC RX IP 258 OP 636: Performed by: NURSE ANESTHETIST, CERTIFIED REGISTERED

## 2025-01-22 PROCEDURE — C1713 ANCHOR/SCREW BN/BN,TIS/BN: HCPCS | Performed by: ORTHOPAEDIC SURGERY

## 2025-01-22 PROCEDURE — 360N000077 HC SURGERY LEVEL 4, PER MIN: Performed by: ORTHOPAEDIC SURGERY

## 2025-01-22 PROCEDURE — 258N000001 HC RX 258: Performed by: ORTHOPAEDIC SURGERY

## 2025-01-22 PROCEDURE — 36415 COLL VENOUS BLD VENIPUNCTURE: CPT | Performed by: PHYSICIAN ASSISTANT

## 2025-01-22 PROCEDURE — 250N000011 HC RX IP 250 OP 636: Performed by: PHYSICIAN ASSISTANT

## 2025-01-22 PROCEDURE — 250N000011 HC RX IP 250 OP 636: Performed by: ORTHOPAEDIC SURGERY

## 2025-01-22 DEVICE — CRUCIATE RETAINING FEMORAL
Type: IMPLANTABLE DEVICE | Site: KNEE | Status: FUNCTIONAL
Brand: TRIATHLON

## 2025-01-22 DEVICE — PIN FIXATION SS FLUTE SQUARE L3.5 IN OD1/8 IN 7650-2038A: Type: IMPLANTABLE DEVICE | Site: KNEE | Status: FUNCTIONAL

## 2025-01-22 DEVICE — PRIMARY TIBIAL BASEPLATE
Type: IMPLANTABLE DEVICE | Site: KNEE | Status: FUNCTIONAL
Brand: TRIATHLON

## 2025-01-22 DEVICE — PATELLA
Type: IMPLANTABLE DEVICE | Site: KNEE | Status: FUNCTIONAL
Brand: TRIATHLON

## 2025-01-22 DEVICE — IMP BONE CEMENT STRK SIMPLEX TOBRAMYCIN 40CC 6197-9-001: Type: IMPLANTABLE DEVICE | Site: KNEE | Status: FUNCTIONAL

## 2025-01-22 DEVICE — TIBIAL BEARING INSERT - CS
Type: IMPLANTABLE DEVICE | Site: KNEE | Status: FUNCTIONAL
Brand: TRIATHLON

## 2025-01-22 RX ORDER — KETAMINE HYDROCHLORIDE 10 MG/ML
INJECTION INTRAMUSCULAR; INTRAVENOUS PRN
Status: DISCONTINUED | OUTPATIENT
Start: 2025-01-22 | End: 2025-01-22

## 2025-01-22 RX ORDER — BISACODYL 10 MG
10 SUPPOSITORY, RECTAL RECTAL DAILY PRN
Status: DISCONTINUED | OUTPATIENT
Start: 2025-01-22 | End: 2025-01-23 | Stop reason: HOSPADM

## 2025-01-22 RX ORDER — METHOCARBAMOL 500 MG/1
250 TABLET ORAL EVERY 6 HOURS PRN
Status: DISCONTINUED | OUTPATIENT
Start: 2025-01-22 | End: 2025-01-23 | Stop reason: HOSPADM

## 2025-01-22 RX ORDER — SODIUM CHLORIDE, SODIUM LACTATE, POTASSIUM CHLORIDE, CALCIUM CHLORIDE 600; 310; 30; 20 MG/100ML; MG/100ML; MG/100ML; MG/100ML
INJECTION, SOLUTION INTRAVENOUS CONTINUOUS
Status: DISCONTINUED | OUTPATIENT
Start: 2025-01-22 | End: 2025-01-22 | Stop reason: HOSPADM

## 2025-01-22 RX ORDER — AMOXICILLIN 250 MG
1 CAPSULE ORAL 2 TIMES DAILY
Status: DISCONTINUED | OUTPATIENT
Start: 2025-01-22 | End: 2025-01-23 | Stop reason: HOSPADM

## 2025-01-22 RX ORDER — FENTANYL CITRATE 50 UG/ML
50 INJECTION, SOLUTION INTRAMUSCULAR; INTRAVENOUS EVERY 5 MIN PRN
Status: DISCONTINUED | OUTPATIENT
Start: 2025-01-22 | End: 2025-01-22 | Stop reason: HOSPADM

## 2025-01-22 RX ORDER — LIDOCAINE 40 MG/G
CREAM TOPICAL
Status: DISCONTINUED | OUTPATIENT
Start: 2025-01-22 | End: 2025-01-22 | Stop reason: HOSPADM

## 2025-01-22 RX ORDER — ONDANSETRON 2 MG/ML
4 INJECTION INTRAMUSCULAR; INTRAVENOUS EVERY 30 MIN PRN
Status: DISCONTINUED | OUTPATIENT
Start: 2025-01-22 | End: 2025-01-22 | Stop reason: DRUGHIGH

## 2025-01-22 RX ORDER — METOPROLOL TARTRATE 1 MG/ML
1-2 INJECTION, SOLUTION INTRAVENOUS EVERY 5 MIN PRN
Status: DISCONTINUED | OUTPATIENT
Start: 2025-01-22 | End: 2025-01-22 | Stop reason: HOSPADM

## 2025-01-22 RX ORDER — LOSARTAN POTASSIUM 25 MG/1
25 TABLET ORAL DAILY
COMMUNITY

## 2025-01-22 RX ORDER — ONDANSETRON 2 MG/ML
INJECTION INTRAMUSCULAR; INTRAVENOUS PRN
Status: DISCONTINUED | OUTPATIENT
Start: 2025-01-22 | End: 2025-01-22

## 2025-01-22 RX ORDER — ASPIRIN 81 MG/1
81 TABLET ORAL DAILY
Status: ON HOLD | COMMUNITY
End: 2025-01-23

## 2025-01-22 RX ORDER — DIPHENHYDRAMINE HCL 12.5 MG/5ML
12.5 SOLUTION ORAL EVERY 6 HOURS PRN
Status: DISCONTINUED | OUTPATIENT
Start: 2025-01-22 | End: 2025-01-23 | Stop reason: HOSPADM

## 2025-01-22 RX ORDER — OXYCODONE HYDROCHLORIDE 5 MG/1
5 TABLET ORAL EVERY 4 HOURS PRN
Status: DISCONTINUED | OUTPATIENT
Start: 2025-01-22 | End: 2025-01-23 | Stop reason: HOSPADM

## 2025-01-22 RX ORDER — FENTANYL CITRATE 50 UG/ML
INJECTION, SOLUTION INTRAMUSCULAR; INTRAVENOUS PRN
Status: DISCONTINUED | OUTPATIENT
Start: 2025-01-22 | End: 2025-01-22

## 2025-01-22 RX ORDER — ONDANSETRON 4 MG/1
4 TABLET, ORALLY DISINTEGRATING ORAL EVERY 6 HOURS PRN
Status: DISCONTINUED | OUTPATIENT
Start: 2025-01-22 | End: 2025-01-23 | Stop reason: HOSPADM

## 2025-01-22 RX ORDER — POLYETHYLENE GLYCOL 3350 17 G/17G
17 POWDER, FOR SOLUTION ORAL DAILY
Status: DISCONTINUED | OUTPATIENT
Start: 2025-01-23 | End: 2025-01-23 | Stop reason: HOSPADM

## 2025-01-22 RX ORDER — DEXAMETHASONE SODIUM PHOSPHATE 4 MG/ML
4 INJECTION, SOLUTION INTRA-ARTICULAR; INTRALESIONAL; INTRAMUSCULAR; INTRAVENOUS; SOFT TISSUE
Status: DISCONTINUED | OUTPATIENT
Start: 2025-01-22 | End: 2025-01-22 | Stop reason: HOSPADM

## 2025-01-22 RX ORDER — TRANEXAMIC ACID 650 MG/1
1950 TABLET ORAL ONCE
Status: COMPLETED | OUTPATIENT
Start: 2025-01-22 | End: 2025-01-22

## 2025-01-22 RX ORDER — PROCHLORPERAZINE MALEATE 5 MG/1
5 TABLET ORAL EVERY 6 HOURS PRN
Status: DISCONTINUED | OUTPATIENT
Start: 2025-01-22 | End: 2025-01-23 | Stop reason: HOSPADM

## 2025-01-22 RX ORDER — BUPIVACAINE HYDROCHLORIDE 7.5 MG/ML
INJECTION, SOLUTION INTRASPINAL
Status: COMPLETED | OUTPATIENT
Start: 2025-01-22 | End: 2025-01-22

## 2025-01-22 RX ORDER — ONDANSETRON 4 MG/1
4 TABLET, ORALLY DISINTEGRATING ORAL EVERY 30 MIN PRN
Status: DISCONTINUED | OUTPATIENT
Start: 2025-01-22 | End: 2025-01-22 | Stop reason: DRUGHIGH

## 2025-01-22 RX ORDER — CEFAZOLIN SODIUM/WATER 2 G/20 ML
2 SYRINGE (ML) INTRAVENOUS
Status: COMPLETED | OUTPATIENT
Start: 2025-01-22 | End: 2025-01-22

## 2025-01-22 RX ORDER — ATORVASTATIN CALCIUM 20 MG/1
40 TABLET, FILM COATED ORAL EVERY EVENING
Status: DISCONTINUED | OUTPATIENT
Start: 2025-01-22 | End: 2025-01-23 | Stop reason: HOSPADM

## 2025-01-22 RX ORDER — HYDROMORPHONE HCL IN WATER/PF 6 MG/30 ML
0.4 PATIENT CONTROLLED ANALGESIA SYRINGE INTRAVENOUS
Status: DISCONTINUED | OUTPATIENT
Start: 2025-01-22 | End: 2025-01-23 | Stop reason: HOSPADM

## 2025-01-22 RX ORDER — ONDANSETRON 2 MG/ML
4 INJECTION INTRAMUSCULAR; INTRAVENOUS EVERY 6 HOURS PRN
Status: DISCONTINUED | OUTPATIENT
Start: 2025-01-22 | End: 2025-01-23 | Stop reason: HOSPADM

## 2025-01-22 RX ORDER — HYDROMORPHONE HCL IN WATER/PF 6 MG/30 ML
0.4 PATIENT CONTROLLED ANALGESIA SYRINGE INTRAVENOUS EVERY 5 MIN PRN
Status: DISCONTINUED | OUTPATIENT
Start: 2025-01-22 | End: 2025-01-22 | Stop reason: HOSPADM

## 2025-01-22 RX ORDER — NALOXONE HYDROCHLORIDE 0.4 MG/ML
0.1 INJECTION, SOLUTION INTRAMUSCULAR; INTRAVENOUS; SUBCUTANEOUS
Status: DISCONTINUED | OUTPATIENT
Start: 2025-01-22 | End: 2025-01-23 | Stop reason: HOSPADM

## 2025-01-22 RX ORDER — OXYCODONE HYDROCHLORIDE 5 MG/1
10 TABLET ORAL EVERY 4 HOURS PRN
Status: DISCONTINUED | OUTPATIENT
Start: 2025-01-22 | End: 2025-01-23 | Stop reason: HOSPADM

## 2025-01-22 RX ORDER — OXYCODONE HYDROCHLORIDE 5 MG/1
5 TABLET ORAL
Status: DISCONTINUED | OUTPATIENT
Start: 2025-01-22 | End: 2025-01-22 | Stop reason: DRUGHIGH

## 2025-01-22 RX ORDER — DEXAMETHASONE SODIUM PHOSPHATE 4 MG/ML
INJECTION, SOLUTION INTRA-ARTICULAR; INTRALESIONAL; INTRAMUSCULAR; INTRAVENOUS; SOFT TISSUE PRN
Status: DISCONTINUED | OUTPATIENT
Start: 2025-01-22 | End: 2025-01-22

## 2025-01-22 RX ORDER — ACETAMINOPHEN 325 MG/1
650 TABLET ORAL EVERY 4 HOURS PRN
Qty: 100 TABLET | Refills: 0 | Status: SHIPPED | OUTPATIENT
Start: 2025-01-22

## 2025-01-22 RX ORDER — OXYCODONE HYDROCHLORIDE 5 MG/1
5-10 TABLET ORAL EVERY 4 HOURS PRN
Qty: 30 TABLET | Refills: 0 | Status: SHIPPED | OUTPATIENT
Start: 2025-01-22

## 2025-01-22 RX ORDER — NALOXONE HYDROCHLORIDE 0.4 MG/ML
0.1 INJECTION, SOLUTION INTRAMUSCULAR; INTRAVENOUS; SUBCUTANEOUS
Status: DISCONTINUED | OUTPATIENT
Start: 2025-01-22 | End: 2025-01-22 | Stop reason: HOSPADM

## 2025-01-22 RX ORDER — ONDANSETRON 2 MG/ML
4 INJECTION INTRAMUSCULAR; INTRAVENOUS EVERY 30 MIN PRN
Status: DISCONTINUED | OUTPATIENT
Start: 2025-01-22 | End: 2025-01-22 | Stop reason: HOSPADM

## 2025-01-22 RX ORDER — HYDROMORPHONE HCL IN WATER/PF 6 MG/30 ML
0.2 PATIENT CONTROLLED ANALGESIA SYRINGE INTRAVENOUS
Status: DISCONTINUED | OUTPATIENT
Start: 2025-01-22 | End: 2025-01-23 | Stop reason: HOSPADM

## 2025-01-22 RX ORDER — HYDROXYZINE HYDROCHLORIDE 10 MG/1
10 TABLET, FILM COATED ORAL EVERY 6 HOURS PRN
Status: DISCONTINUED | OUTPATIENT
Start: 2025-01-22 | End: 2025-01-22 | Stop reason: HOSPADM

## 2025-01-22 RX ORDER — ASPIRIN 325 MG
325 TABLET, DELAYED RELEASE (ENTERIC COATED) ORAL DAILY
Status: DISCONTINUED | OUTPATIENT
Start: 2025-01-22 | End: 2025-01-23 | Stop reason: HOSPADM

## 2025-01-22 RX ORDER — CEFAZOLIN SODIUM/WATER 2 G/20 ML
2 SYRINGE (ML) INTRAVENOUS SEE ADMIN INSTRUCTIONS
Status: DISCONTINUED | OUTPATIENT
Start: 2025-01-22 | End: 2025-01-22 | Stop reason: HOSPADM

## 2025-01-22 RX ORDER — FAMOTIDINE 20 MG/1
20 TABLET, FILM COATED ORAL 2 TIMES DAILY
Status: DISCONTINUED | OUTPATIENT
Start: 2025-01-22 | End: 2025-01-23 | Stop reason: HOSPADM

## 2025-01-22 RX ORDER — PROPOFOL 10 MG/ML
INJECTION, EMULSION INTRAVENOUS CONTINUOUS PRN
Status: DISCONTINUED | OUTPATIENT
Start: 2025-01-22 | End: 2025-01-22

## 2025-01-22 RX ORDER — ATORVASTATIN CALCIUM 40 MG/1
40 TABLET, FILM COATED ORAL DAILY
COMMUNITY

## 2025-01-22 RX ORDER — METOPROLOL SUCCINATE 100 MG/1
100 TABLET, EXTENDED RELEASE ORAL DAILY
Status: DISCONTINUED | OUTPATIENT
Start: 2025-01-23 | End: 2025-01-23

## 2025-01-22 RX ORDER — ONDANSETRON 4 MG/1
4 TABLET, ORALLY DISINTEGRATING ORAL EVERY 30 MIN PRN
Status: DISCONTINUED | OUTPATIENT
Start: 2025-01-22 | End: 2025-01-22 | Stop reason: HOSPADM

## 2025-01-22 RX ORDER — ASPIRIN 325 MG
325 TABLET, DELAYED RELEASE (ENTERIC COATED) ORAL DAILY
Status: SHIPPED
Start: 2025-01-22

## 2025-01-22 RX ORDER — SODIUM CHLORIDE, SODIUM LACTATE, POTASSIUM CHLORIDE, CALCIUM CHLORIDE 600; 310; 30; 20 MG/100ML; MG/100ML; MG/100ML; MG/100ML
INJECTION, SOLUTION INTRAVENOUS CONTINUOUS
Status: DISCONTINUED | OUTPATIENT
Start: 2025-01-22 | End: 2025-01-23 | Stop reason: HOSPADM

## 2025-01-22 RX ORDER — ACETAMINOPHEN 325 MG/1
975 TABLET ORAL ONCE
Status: COMPLETED | OUTPATIENT
Start: 2025-01-22 | End: 2025-01-22

## 2025-01-22 RX ORDER — AMOXICILLIN 250 MG
1-2 CAPSULE ORAL 2 TIMES DAILY
Status: SHIPPED
Start: 2025-01-22

## 2025-01-22 RX ORDER — DEXAMETHASONE SODIUM PHOSPHATE 4 MG/ML
4 INJECTION, SOLUTION INTRA-ARTICULAR; INTRALESIONAL; INTRAMUSCULAR; INTRAVENOUS; SOFT TISSUE
Status: DISCONTINUED | OUTPATIENT
Start: 2025-01-22 | End: 2025-01-23 | Stop reason: HOSPADM

## 2025-01-22 RX ORDER — LIDOCAINE 40 MG/G
CREAM TOPICAL
Status: DISCONTINUED | OUTPATIENT
Start: 2025-01-22 | End: 2025-01-23 | Stop reason: HOSPADM

## 2025-01-22 RX ORDER — CEFAZOLIN 2 G/1
2 INJECTION, POWDER, FOR SOLUTION INTRAVENOUS EVERY 8 HOURS
Status: COMPLETED | OUTPATIENT
Start: 2025-01-23 | End: 2025-01-23

## 2025-01-22 RX ORDER — METHOCARBAMOL 500 MG/1
500 TABLET, FILM COATED ORAL 4 TIMES DAILY PRN
Qty: 60 TABLET | Refills: 1 | Status: SHIPPED | OUTPATIENT
Start: 2025-01-22

## 2025-01-22 RX ORDER — LIDOCAINE HYDROCHLORIDE 20 MG/ML
INJECTION, SOLUTION INFILTRATION; PERINEURAL PRN
Status: DISCONTINUED | OUTPATIENT
Start: 2025-01-22 | End: 2025-01-22

## 2025-01-22 RX ORDER — ACETAMINOPHEN 325 MG/1
975 TABLET ORAL EVERY 8 HOURS
Status: DISCONTINUED | OUTPATIENT
Start: 2025-01-22 | End: 2025-01-23 | Stop reason: HOSPADM

## 2025-01-22 RX ORDER — LOSARTAN POTASSIUM 25 MG/1
25 TABLET ORAL DAILY
Status: DISCONTINUED | OUTPATIENT
Start: 2025-01-23 | End: 2025-01-23 | Stop reason: HOSPADM

## 2025-01-22 RX ORDER — GLYCOPYRROLATE 0.2 MG/ML
INJECTION, SOLUTION INTRAMUSCULAR; INTRAVENOUS PRN
Status: DISCONTINUED | OUTPATIENT
Start: 2025-01-22 | End: 2025-01-22

## 2025-01-22 RX ORDER — OXYCODONE HYDROCHLORIDE 5 MG/1
10 TABLET ORAL
Status: DISCONTINUED | OUTPATIENT
Start: 2025-01-22 | End: 2025-01-22 | Stop reason: DRUGHIGH

## 2025-01-22 RX ADMIN — KETAMINE HYDROCHLORIDE 10 MG: 10 INJECTION INTRAMUSCULAR; INTRAVENOUS at 14:23

## 2025-01-22 RX ADMIN — MIDAZOLAM 2 MG: 1 INJECTION INTRAMUSCULAR; INTRAVENOUS at 14:10

## 2025-01-22 RX ADMIN — KETAMINE HYDROCHLORIDE 10 MG: 10 INJECTION INTRAMUSCULAR; INTRAVENOUS at 14:30

## 2025-01-22 RX ADMIN — SODIUM CHLORIDE, POTASSIUM CHLORIDE, SODIUM LACTATE AND CALCIUM CHLORIDE: 600; 310; 30; 20 INJECTION, SOLUTION INTRAVENOUS at 17:59

## 2025-01-22 RX ADMIN — SODIUM CHLORIDE, POTASSIUM CHLORIDE, SODIUM LACTATE AND CALCIUM CHLORIDE 1000 ML: 600; 310; 30; 20 INJECTION, SOLUTION INTRAVENOUS at 12:49

## 2025-01-22 RX ADMIN — KETAMINE HYDROCHLORIDE 10 MG: 10 INJECTION INTRAMUSCULAR; INTRAVENOUS at 14:24

## 2025-01-22 RX ADMIN — BUPIVACAINE HYDROCHLORIDE IN DEXTROSE 1.6 ML: 7.5 INJECTION, SOLUTION SUBARACHNOID at 14:12

## 2025-01-22 RX ADMIN — FENTANYL CITRATE 100 MCG: 50 INJECTION INTRAMUSCULAR; INTRAVENOUS at 14:15

## 2025-01-22 RX ADMIN — MIDAZOLAM 2 MG: 1 INJECTION INTRAMUSCULAR; INTRAVENOUS at 14:05

## 2025-01-22 RX ADMIN — SODIUM CHLORIDE, POTASSIUM CHLORIDE, SODIUM LACTATE AND CALCIUM CHLORIDE: 600; 310; 30; 20 INJECTION, SOLUTION INTRAVENOUS at 20:33

## 2025-01-22 RX ADMIN — ATORVASTATIN CALCIUM 40 MG: 20 TABLET, FILM COATED ORAL at 20:28

## 2025-01-22 RX ADMIN — Medication 2 G: at 14:02

## 2025-01-22 RX ADMIN — DEXAMETHASONE SODIUM PHOSPHATE 4 MG: 4 INJECTION, SOLUTION INTRA-ARTICULAR; INTRALESIONAL; INTRAMUSCULAR; INTRAVENOUS; SOFT TISSUE at 14:20

## 2025-01-22 RX ADMIN — CEFAZOLIN 2 G: 2 INJECTION, POWDER, LYOPHILIZED, FOR SOLUTION INTRAVENOUS at 23:42

## 2025-01-22 RX ADMIN — LIDOCAINE HYDROCHLORIDE 100 MG: 20 INJECTION, SOLUTION INFILTRATION; PERINEURAL at 14:24

## 2025-01-22 RX ADMIN — PROPOFOL 25 MCG/KG/MIN: 10 INJECTION, EMULSION INTRAVENOUS at 14:14

## 2025-01-22 RX ADMIN — ASPIRIN 325 MG: 325 TABLET, COATED ORAL at 17:59

## 2025-01-22 RX ADMIN — KETAMINE HYDROCHLORIDE 20 MG: 10 INJECTION INTRAMUSCULAR; INTRAVENOUS at 14:55

## 2025-01-22 RX ADMIN — GLYCOPYRROLATE 0.3 MG: 0.2 INJECTION, SOLUTION INTRAMUSCULAR; INTRAVENOUS at 14:23

## 2025-01-22 RX ADMIN — TRANEXAMIC ACID 1950 MG: 650 TABLET ORAL at 12:20

## 2025-01-22 RX ADMIN — LIDOCAINE HYDROCHLORIDE 0.1 ML: 10 INJECTION, SOLUTION EPIDURAL; INFILTRATION; INTRACAUDAL; PERINEURAL at 12:50

## 2025-01-22 RX ADMIN — ACETAMINOPHEN 975 MG: 325 TABLET, FILM COATED ORAL at 20:29

## 2025-01-22 RX ADMIN — KETAMINE HYDROCHLORIDE 10 MG: 10 INJECTION INTRAMUSCULAR; INTRAVENOUS at 14:51

## 2025-01-22 RX ADMIN — KETAMINE HYDROCHLORIDE 20 MG: 10 INJECTION INTRAMUSCULAR; INTRAVENOUS at 14:31

## 2025-01-22 RX ADMIN — FAMOTIDINE 20 MG: 20 TABLET, FILM COATED ORAL at 20:29

## 2025-01-22 RX ADMIN — KETAMINE HYDROCHLORIDE 20 MG: 10 INJECTION INTRAMUSCULAR; INTRAVENOUS at 14:44

## 2025-01-22 RX ADMIN — ONDANSETRON 4 MG: 2 INJECTION INTRAMUSCULAR; INTRAVENOUS at 14:20

## 2025-01-22 RX ADMIN — ACETAMINOPHEN 975 MG: 325 TABLET, FILM COATED ORAL at 12:19

## 2025-01-22 ASSESSMENT — ACTIVITIES OF DAILY LIVING (ADL)
ADLS_ACUITY_SCORE: 15
ADLS_ACUITY_SCORE: 16
ADLS_ACUITY_SCORE: 24
ADLS_ACUITY_SCORE: 31
ADLS_ACUITY_SCORE: 31
ADLS_ACUITY_SCORE: 16

## 2025-01-22 NOTE — ANESTHESIA POSTPROCEDURE EVALUATION
Patient: Antonia Gordon    Procedure: Procedure(s):  Total Knee Arthroplasty       Anesthesia Type:  Spinal    Note:  Disposition: Inpatient   Postop Pain Control: Uneventful            Sign Out: Well controlled pain   PONV: No   Neuro/Psych: Uneventful            Sign Out: Acceptable/Baseline neuro status   Airway/Respiratory: Uneventful            Sign Out: Acceptable/Baseline resp. status   CV/Hemodynamics: Uneventful            Sign Out: Acceptable CV status; No obvious hypovolemia; No obvious fluid overload   Other NRE: NONE   DID A NON-ROUTINE EVENT OCCUR? No           Last vitals:  Vitals:    01/22/25 1203 01/22/25 1254   BP:  (!) 164/74   Resp: 16    Temp: 36.4  C (97.5  F)    SpO2: 98%        Electronically Signed By: NAOMI Jeff CRNA  January 22, 2025  4:05 PM

## 2025-01-22 NOTE — OP NOTE
Total Knee Arthroplasty Operative Note        PLAN:  Weight bearing status: Weight bearing as tolerated   Activity: Activity as tolerated  Patient may move about with assist as indicated or with supervision   Anticoagulation plan:                 ASA 325mg po qd  for 42days days  Follow up plan                           Follow up in 2 week(s)        Name: Antonia Gordon    PCP: Chelly Richard    Procedure Date: 1/22/2025    Pre-operative diagnosis: Knee pain, left [M25.562]  Osteoarthritis [M19.90]   Post-operative diagnosis: Same   Procedure: Total knee arthoplasty (Left)   Surgeon: All Burris MD     Assistant(s): Cruz Vela PA-C   Anesthesia: Spinal Anesthesia   Estimated blood loss: Less than 50 ml   Drains: Hemovac   Specimens: None       Findings: See full dictated operative note for details   Complications: None       Comments: See dictated operative report for full details       Indications:    The patient has experienced progressive left knee pain despite use of  Analgesics, NSAID's, Injection therapy and physical therapy. Because of the failure of these therapies to control symptoms and limited walking, night pain and altered activities the patient has decided to move ahead with joint replacement surgery.    Procedure and Findings:    After being informed of risks, benefits, alternatives to the procedure, patient desired to proceed, brought to the operating suite where they were placed under spinal anesthetic. Patient received 2 grams of intravenous Ancef.  Cruz Vela PA-C was present for the entire length of the case for the purposes of proper patient positioning, surgical exposure, and patient safety. A time-out verification step was completed.    Examination of the joint surfaces demonstrated full thickness cartilage loss involving themedial compartments of the left knee.    A midline incision, minimally invasive approach was utilized. A para-patellar arthrotomy was performed.  Attention was first directed to the patella. The patella was everted. It was measured at 35 mm. It was resected, remeasured and a 35 mm asymmetric patella was positioned. A protector plate was placed on the patella. Attention was directed toward the distal femur. IM guider monika was placed. An 8 mm 7 degree distal femoral cut was completed. The IM guide monika was then placed in the tibia. External guide monika and tower were utilized and 9 mm button stylus was referenced off the lateral tibial plateau and cuts were completed. The tibia was sized to a 4. Attention was directed towards the distal femur. It was sized to a 4. The 4-in-1 cutting block was placed along the epicondylar axis. It was secured with 2 pins, anterior, posterior and chamfer cuts were completed. Soft tissue balancing was then carried out. Medial release was completed. Ultimately a 10 mm CS insert gave excellent range of motion and stability throughout the range of motion. Patella was noted to track symmetrically throughout the range to motion. The tibial tray rotation was marked, size was verified, it was secured with 2 pins and punched. Trial components were then removed. The cancellous surfaces were pulsatile lavaged. One batch of Simplex cement was mixed under vacuum. The tibia, femur and patella were sequentially cemented in place. The knee was held in extension with axial compression until the cement had hardened. The 10 mm insert was ultimately selected and secured Care was taken to remove any excess cement. Joint capsular injection with anesthetic solution was performed. Excellent range of motion and stability was demonstrated. A Hemovac drain was placed. The joint was copiously irrigated. Joint capsules were closed with interrupted number 1 running Stratafix. Subcutaneous tissues were closed with 2-0 Vicryl and skin was closed with 3-0 running Stratifix for wound closure and Aquacell. A sterile dressing was applied. Patient tolerated the  procedure well without complication and returned to the PAR in stable condition.      All Burris MD    Date: 1/22/2025 Time: 4:07 PM  ?    CONFIDENTIALITY NOTICE This message and any included attachments are from [facility] and are intended only for the addressee. The information contained in this message is confidential and may constitute inside or non-public information under international, federal, or state securities laws. Unauthorized forwarding, printing, copying, distribution, or use of such information is strictly prohibited and may be unlawful. If you are not the addressee, please promptly delete this message and notify the sender of the delivery error by e-mail.

## 2025-01-22 NOTE — ANESTHESIA PREPROCEDURE EVALUATION
Anesthesia Pre-Procedure Evaluation    Patient: Antonia Gordon   MRN: [id number] : 1940        Procedure : Procedure(s):  Total knee arthroplasty          Past Medical History:   Diagnosis Date     Hypertension       Past Surgical History:   Procedure Laterality Date     D & C       &      GYN SURGERY      both ovaries removed      ORTHOPEDIC SURGERY      right knee repair       Allergies   Allergen Reactions     Cephalexin Itching and Rash     Cephalosporins Rash      Social History     Tobacco Use     Smoking status: Not on file     Smokeless tobacco: Not on file   Substance Use Topics     Alcohol use: Not on file      Wt Readings from Last 1 Encounters:   25 82.6 kg (182 lb)        Anesthesia Evaluation            ROS/MED HX  ENT/Pulmonary:  - neg pulmonary ROS     Neurologic:  - neg neurologic ROS     Cardiovascular:     (+)  hypertension- -   -  - stent-2023. 1 Drug Eluting Stent.                                 (-) taking anticoagulants/antiplatelets   METS/Exercise Tolerance:     Hematologic:  - neg hematologic  ROS     Musculoskeletal:  - neg musculoskeletal ROS     GI/Hepatic:       Renal/Genitourinary:  - neg Renal ROS     Endo:  - neg endo ROS     Psychiatric/Substance Use:  - neg psychiatric ROS     Infectious Disease:  - neg infectious disease ROS     Malignancy:  - neg malignancy ROS     Other:  - neg other ROS          Physical Exam    Airway        Mallampati: II   TM distance: > 3 FB   Neck ROM: full   Mouth opening: > 3 cm    Respiratory Devices and Support         Dental       (+) Minor Abnormalities - some fillings, tiny chips      Cardiovascular   cardiovascular exam normal          Pulmonary   pulmonary exam normal            OUTSIDE LABS:  CBC:   Lab Results   Component Value Date    WBC 7.8 2025    WBC 8.7 2023    HGB 13.2 2025    HGB 14.0 2023    HCT 39.0 2025    HCT 43.2 2023     2025     2023  "    BMP:   Lab Results   Component Value Date     07/23/2023     07/12/2023    POTASSIUM 4.1 01/22/2025    POTASSIUM 4.3 07/23/2023    CHLORIDE 106 07/23/2023    CHLORIDE 108 (H) 07/12/2023    CO2 24 07/23/2023    CO2 25 07/12/2023    BUN 20.3 07/23/2023    BUN 22.7 07/12/2023    CR 0.85 01/22/2025    CR 0.89 07/23/2023    GLC 99 01/22/2025     (H) 07/23/2023     COAGS: No results found for: \"PTT\", \"INR\", \"FIBR\"  POC: No results found for: \"BGM\", \"HCG\", \"HCGS\"  HEPATIC:   Lab Results   Component Value Date    ALBUMIN 3.9 07/23/2023    PROTTOTAL 6.7 07/23/2023    ALT 20 07/23/2023    AST 18 07/23/2023    ALKPHOS 82 07/23/2023    BILITOTAL 0.5 07/23/2023     OTHER:   Lab Results   Component Value Date    GILBERT 9.3 07/23/2023    CRP <5.0 04/10/2011    SED 15 04/10/2011       Anesthesia Plan    ASA Status:  3    NPO Status:  NPO Appropriate    Anesthesia Type: Spinal.              Consents    Anesthesia Plan(s) and associated risks, benefits, and realistic alternatives discussed. Questions answered and patient/representative(s) expressed understanding.     - Discussed: Risks, Benefits and Alternatives for BOTH SEDATION and the PROCEDURE were discussed     - Discussed with:  Patient, Spouse      - Extended Intubation/Ventilatory Support Discussed: No.      - Patient is DNR/DNI Status: No     Use of blood products discussed: No .     Postoperative Care    Pain management: IV analgesics, Oral pain medications, Multi-modal analgesia.   PONV prophylaxis: Ondansetron (or other 5HT-3), Dexamethasone or Solumedrol     Comments:             Elías Garcia    I have reviewed the pertinent notes and labs in the chart from the past 30 days and (re)examined the patient.  Any updates or changes from those notes are reflected in this note.    Clinically Significant Risk Factors Present on Admission                 # Drug Induced Platelet Defect: home medication list includes an antiplatelet medication   # " "Hypertension: Home medication list includes antihypertensive(s)           # Obesity: Estimated body mass index is 30.29 kg/m  as calculated from the following:    Height as of this encounter: 1.651 m (5' 5\").    Weight as of this encounter: 82.6 kg (182 lb).                "

## 2025-01-22 NOTE — ANESTHESIA CARE TRANSFER NOTE
Patient: Antonia Gordon    Procedure: Procedure(s):  Total Knee Arthroplasty       Diagnosis: Knee pain, left [M25.562]  Osteoarthritis [M19.90]  Diagnosis Additional Information: No value filed.    Anesthesia Type:   Spinal     Note:    Oropharynx: oropharynx clear of all foreign objects and spontaneously breathing  Level of Consciousness: awake and drowsy  Oxygen Supplementation: room air    Independent Airway: airway patency satisfactory and stable  Dentition: dentition unchanged  Vital Signs Stable: post-procedure vital signs reviewed and stable  Report to RN Given: handoff report given  Patient transferred to: Phase II    Handoff Report: Identifed the Patient, Identified the Reponsible Provider, Reviewed the pertinent medical history, Discussed the surgical course, Reviewed Intra-OP anesthesia mangement and issues during anesthesia, Set expectations for post-procedure period and Allowed opportunity for questions and acknowledgement of understanding      Vitals:  Vitals Value Taken Time   BP     Temp     Pulse     Resp     SpO2         Electronically Signed By: NAOMI Jeff CRNA  January 22, 2025  4:05 PM

## 2025-01-22 NOTE — ANESTHESIA PROCEDURE NOTES
"Intrathecal injection Procedure Note    Pre-Procedure   Staff -        CRNA: Shun Cooper APRN CRNA       Other Anesthesia Staff: Elías Garcia       Performed By: CRNA and NANNETTE       Location: OR       Procedure Start/Stop Times: 1/22/2025 2:06 PM and 1/22/2025 2:12 PM       Pre-Anesthestic Checklist: patient identified, IV checked, risks and benefits discussed, informed consent, monitors and equipment checked, pre-op evaluation, at physician/surgeon's request and post-op pain management  Timeout:       Correct Patient: Yes        Correct Procedure: Yes        Correct Site: Yes        Correct Position: Yes   Procedure Documentation  Procedure: intrathecal injection         Patient Position: sitting       Skin prep: Betadine       Insertion Site: L3-4. (midline approach).       Needle Gauge: 25.        Needle Length (Inches): 3.5        Spinal Needle Type: Pencan       Introducer used       # of attempts: 1 and  # of redirects:  0    Assessment/Narrative         Paresthesias: No.       CSF fluid: clear.    Medication(s) Administered   0.75% Hyperbaric Bupivacaine (Intrathecal) - Intrathecal   1.6 mL - 1/22/2025 2:12:00 PM  Medication Administration Time: 1/22/2025 2:06 PM      [place] [facility] [place] [place]:   Pain Team Contact information: please page the Pain Team Via [employer]. Search \"Pain\". During daytime hours, please page the attending first. At night please page the resident first.      "

## 2025-01-22 NOTE — BRIEF OP NOTE
[facility]    Brief Operative Note    Pre-operative diagnosis: Knee pain, left [M25.562]  Osteoarthritis [M19.90]  Post-operative diagnosis Same as pre-operative diagnosis    Procedure: Total Knee Arthroplasty, Left - Knee    Surgeon: Surgeons and Role:     * All Burris MD - Primary     * Cruz Vela PA-C - Assisting  Anesthesia: Spinal   Estimated Blood Loss: 25 mL from 1/22/2025  2:05 PM to 1/22/2025  4:06 PM      Drains: Hemovac  Specimens: * No specimens in log *  Findings:   None.  Complications: None.  Implants:   Implant Name Type Inv. Item Serial No.  Lot No. LRB No. Used Action   IMP BONE CEMENT STRK SIMPLEX TOBRAMYCIN [facility] 6197-9-001 - [id number] Cement, Bone IMP BONE CEMENT STRK SIMPLEX TOBRAMYCIN [facility] 6197-9-001  [employer] [id number] Left 1 Implanted   PIN FIXATION SS FLUTE SQUARE L3.5 IN OD1/8 IN 7650-2038A - [id number] Metallic Hardware/Max PIN FIXATION SS FLUTE SQUARE L3.5 IN OD1/8 IN 7650-2038A  [employer]  Left 1 Used as a Supply   IMP BASEPLATE TIBIAL HOWM TRI 4 5520-B-400 - [id number] Total Joint Component/Insert IMP BASEPLATE TIBIAL HOWM TRI 4 5520-B-400  [employer] [id number] Left 1 Implanted   IMP COMP FEM STRK TRIATHLN CR LT 4 5510-F-401 - [id number] Total Joint Component/Insert IMP COMP FEM STRK TRIATHLN CR LT 4 5510-F-401  [employer] [id number] Left 1 Implanted   IMP COMP PATELLA HOWM TRI 35 10MM 5551-L-350 - [id number] Total Joint Component/Insert IMP COMP PATELLA HOWM TRI 35 10MM 5551-L-350  [employer] [id number] Left 1 Implanted   IMP TIBIAL INSERT STRK X3 TRIATHLON CS SZ4 10MM 5369-Q-741-E - [id number] Total Joint Component/Insert IMP TIBIAL INSERT STRK X3 TRIATHLON CS SZ4 10MM 1027-M-823-E  [employer] [id number] Left 1 Implanted

## 2025-01-23 ENCOUNTER — APPOINTMENT (OUTPATIENT)
Dept: PHYSICAL THERAPY | Facility: CLINIC | Age: 85
End: 2025-01-23
Attending: ORTHOPAEDIC SURGERY
Payer: COMMERCIAL

## 2025-01-23 VITALS
HEART RATE: 77 BPM | SYSTOLIC BLOOD PRESSURE: 153 MMHG | TEMPERATURE: 97.6 F | BODY MASS INDEX: 30.32 KG/M2 | HEIGHT: 65 IN | WEIGHT: 182 LBS | OXYGEN SATURATION: 96 % | DIASTOLIC BLOOD PRESSURE: 82 MMHG | RESPIRATION RATE: 17 BRPM

## 2025-01-23 LAB
FASTING STATUS PATIENT QL REPORTED: YES
GLUCOSE SERPL-MCNC: 146 MG/DL (ref 70–99)
HGB BLD-MCNC: 11.7 G/DL (ref 11.7–15.7)

## 2025-01-23 PROCEDURE — 36415 COLL VENOUS BLD VENIPUNCTURE: CPT | Performed by: ORTHOPAEDIC SURGERY

## 2025-01-23 PROCEDURE — 97530 THERAPEUTIC ACTIVITIES: CPT | Mod: GP

## 2025-01-23 PROCEDURE — 97116 GAIT TRAINING THERAPY: CPT | Mod: GP

## 2025-01-23 PROCEDURE — 250N000011 HC RX IP 250 OP 636: Performed by: ORTHOPAEDIC SURGERY

## 2025-01-23 PROCEDURE — 82947 ASSAY GLUCOSE BLOOD QUANT: CPT | Performed by: ORTHOPAEDIC SURGERY

## 2025-01-23 PROCEDURE — 97161 PT EVAL LOW COMPLEX 20 MIN: CPT | Mod: GP

## 2025-01-23 PROCEDURE — 99232 SBSQ HOSP IP/OBS MODERATE 35: CPT

## 2025-01-23 PROCEDURE — 999N000111 HC STATISTIC OT IP EVAL DEFER

## 2025-01-23 PROCEDURE — 250N000013 HC RX MED GY IP 250 OP 250 PS 637

## 2025-01-23 PROCEDURE — 250N000013 HC RX MED GY IP 250 OP 250 PS 637: Performed by: ORTHOPAEDIC SURGERY

## 2025-01-23 PROCEDURE — 85018 HEMOGLOBIN: CPT | Performed by: ORTHOPAEDIC SURGERY

## 2025-01-23 RX ORDER — METOPROLOL SUCCINATE 50 MG/1
50 TABLET, EXTENDED RELEASE ORAL DAILY
COMMUNITY
Start: 2025-01-24

## 2025-01-23 RX ORDER — METOPROLOL SUCCINATE 50 MG/1
50 TABLET, EXTENDED RELEASE ORAL DAILY
Status: DISCONTINUED | OUTPATIENT
Start: 2025-01-23 | End: 2025-01-23 | Stop reason: HOSPADM

## 2025-01-23 RX ORDER — HYDROMORPHONE HYDROCHLORIDE 2 MG/1
2 TABLET ORAL
Status: DISCONTINUED | OUTPATIENT
Start: 2025-01-23 | End: 2025-01-23

## 2025-01-23 RX ORDER — ASPIRIN 81 MG/1
81 TABLET ORAL DAILY
Status: SHIPPED
Start: 2025-01-23

## 2025-01-23 RX ADMIN — POLYETHYLENE GLYCOL 3350 17 G: 17 POWDER, FOR SOLUTION ORAL at 08:25

## 2025-01-23 RX ADMIN — ASPIRIN 325 MG: 325 TABLET, COATED ORAL at 08:25

## 2025-01-23 RX ADMIN — LOSARTAN POTASSIUM 25 MG: 25 TABLET, FILM COATED ORAL at 08:25

## 2025-01-23 RX ADMIN — SENNOSIDES AND DOCUSATE SODIUM 1 TABLET: 50; 8.6 TABLET ORAL at 08:25

## 2025-01-23 RX ADMIN — ACETAMINOPHEN 975 MG: 325 TABLET, FILM COATED ORAL at 11:10

## 2025-01-23 RX ADMIN — METOPROLOL SUCCINATE 50 MG: 50 TABLET, EXTENDED RELEASE ORAL at 11:11

## 2025-01-23 RX ADMIN — ACETAMINOPHEN 975 MG: 325 TABLET, FILM COATED ORAL at 03:56

## 2025-01-23 RX ADMIN — CEFAZOLIN 2 G: 2 INJECTION, POWDER, LYOPHILIZED, FOR SOLUTION INTRAVENOUS at 08:25

## 2025-01-23 ASSESSMENT — ACTIVITIES OF DAILY LIVING (ADL)
ADLS_ACUITY_SCORE: 28
ADLS_ACUITY_SCORE: 30
ADLS_ACUITY_SCORE: 28
ADLS_ACUITY_SCORE: 30
ADLS_ACUITY_SCORE: 28

## 2025-01-23 NOTE — PROGRESS NOTES
[facility]OHNN[facility] DISCHARGE NOTE    Patient discharged to home at 12:30 PM via wheel chair. Accompanied by spouse and staff. Discharge instructions reviewed with patient and spouse, opportunity offered to ask questions. Prescriptions sent to patients preferred pharmacy. All belongings sent with patient.    Anjel Herrera RN

## 2025-01-23 NOTE — DISCHARGE SUMMARY
[facility] Discharge Summary                                  [facility]     BETINA WHITAKER [id number]   Age: 84 year old  PCP: Chelly Richard, [phone] 1940     Date of Admission:  1/22/2025  Date of Discharge::  1/23/2025  Discharge Physician:  Reyes Montalvo PA-C    Code status:  Full Code    Admission Information:  Admission Diagnosis:  Knee pain, left [M25.562]  Osteoarthritis [M19.90]    Post-Operative Day: 1 Day Post-Op     Reason for admission:  The patient was admitted for the following:Procedure(s) (LRB):  Total Knee Arthroplasty (Left)    Active Problems:    Left knee DJD      Allergies:  Cephalexin and Cephalosporins    Following the procedure noted above the patient was transferred to the post-op floor and started on:    Therapy:  physical therapy  Anticoagulation Plan:  mg daily  for 42 days  Pain Management: oxycodone, tylenol, and Robaxin  Weight bearing status: Weight bearing as tolerated     The patient was followed and co-managed by the hospitalist service during the inpatient treatment course  Complications:  None  Consultations:  None     Pertinent Labs   Lab Results: personally reviewed.     Recent Labs   Lab Test 01/23/25  0531 01/22/25  1211 07/23/23  0535 07/12/23  0403   WBC  --  7.8 8.7 7.6   HGB 11.7 13.2 14.0 12.8   HCT  --  39.0 43.2 38.8   MCV  --  91 94 92   PLT  --  162 169 153   NA  --   --  141 141          Discharge Information:  Condition at discharge: Stable  Discharge destination:  Discharged to home     Medications at discharge:  Current Discharge Medication List        START taking these medications    Details   acetaminophen (TYLENOL) 325 MG tablet Take 2 tablets (650 mg) by mouth every 4 hours as needed for other (mild pain).  Qty: 100 tablet, Refills: 0    Associated Diagnoses: H/O total knee replacement, left      methocarbamol (ROBAXIN) 500 MG tablet Take 1 tablet (500 mg) by mouth 4 times daily as needed for muscle  spasms.  Qty: 60 tablet, Refills: 1    Associated Diagnoses: H/O total knee replacement, left      oxyCODONE (ROXICODONE) 5 MG tablet Take 1-2 tablets (5-10 mg) by mouth every 4 hours as needed for moderate to severe pain.  Qty: 30 tablet, Refills: 0    Associated Diagnoses: H/O total knee replacement, left      senna-docusate (SENOKOT-S/PERICOLACE) 8.6-50 MG tablet Take 1-2 tablets by mouth 2 times daily. Take while on oral narcotics to prevent or treat constipation.    Comments: While taking narcotics  Associated Diagnoses: H/O total knee replacement, left           CONTINUE these medications which have CHANGED    Details   !! aspirin (ASA) 325 MG EC tablet Take 1 tablet (325 mg) by mouth daily.    Associated Diagnoses: H/O total knee replacement, left      !! aspirin 81 MG EC tablet Take 1 tablet (81 mg) by mouth daily.    Comments: Ok to resume once 42 days post op ASA 325mg complete.  Associated Diagnoses: H/O total knee replacement, left       !! - Potential duplicate medications found. Please discuss with provider.        CONTINUE these medications which have NOT CHANGED    Details   atorvastatin (LIPITOR) 40 MG tablet Take 40 mg by mouth daily.      losartan (COZAAR) 25 MG tablet Take 25 mg by mouth daily.      TOPROL XL# 100 MG OR TB24 1 TABLET ORALLY DAILY      CLOBETASOL PROPIONATE 0.05 % EX CREA AS NEEDED FOR ECZEMA                        Follow-Up Care:  Patient should be seen in the office in 14 days by the Orthopedic Surgeon/Physician Assistant.  Call [phone] for appointment or questions.    Reyes Montalvo PA-C

## 2025-01-23 NOTE — PROGRESS NOTES
[facility] Medicine Progress Note  Date of Service: 01/23/2025    Assessment & Plan   Antonia Gordon is a 84 year old female who presented on 1/22/2025 for scheduled Procedure(s):  Total Knee Arthroplasty by All Burris MD and is being followed by the hospital medicine service for co-management of acute and/or chronic perioperative medical problems.      S/p Procedure(s):  Total Knee Arthroplasty   1 Day Post-Op    Following plan per orthopedic surgery service;  -Pain control  -Wound cares  -Antibiotic prophylaxis  -Physical/occupational therapy  -DVT prophylaxis    Coronary artery disease (CAD) s/p OFELIA    Appears stable. Denies chest pain or discomfort. H/o CAD with OFELIA 9/2023. Was managed with DAPT (clopidogrel and ASA) x 1 year. Currently managed with ASA 81 mg daily and atorvastatin 40 mg.  - Holding PTA ASA in substitution for  DVT prophylactic dosing x 42 days, resume PTA ASA after completion.    DVT Prophylaxis: as per orthopedic surgery service -  mg daily.  Code Status: Full Code    Lines: PIV   Franco catheter: None    Discussion: Medically, the patient appears optimized for discharge. Patient must be evaluated/cleared by orthopedic provider and therapies prior to discharging.    Disposition: Anticipate discharge 1/23/25     Attestation:  I have discussed, or will be discussing, the patient with hospitalist physician, Dr. Joaquín Burkett.    GLORIA VALLES PA-C     Interval History   POD#1: Routine post-operative morning rounds. No overnight events. Appetite intact, tolerating PO without nausea or vomiting. Ambulating well, feeling steady on feet without lightheadedness or dizziness. Passing flatus without a bowel movement at this time. Urinating without difficulty. Denies fever, chills, chest pain, palpitations, shortness of breath, lightheadedness, nausea, vomiting, abdominal pain, and paresthesias.     Physical Exam   Temp:  [97.4  F (36.3   C)-98.3  F (36.8  C)] 97.5  F (36.4  C)  Pulse:  [68-93] 68  Resp:  [11-18] 16  BP: (125-164)/(66-91) 155/84  SpO2:  [92 %-98 %] 94 %    Weights:   Vitals:    01/22/25 1153   Weight: 82.6 kg (182 lb)    Body mass index is 30.29 kg/m .    General: Alert, oriented, no acute distress, non-toxic appearing.  CV: Regular rate and rhythm. Normal S1 and S2. No S3, S4, or murmurs. Radial pulse regular and strong bilaterally. No lower extremity edema.   Respiratory: Lungs clear to auscultation bilaterally. No wheezes, rhonchi, or crackles. Normal respiratory effort on room air. Speaking in full sentences.  GI: Soft, nondistended, nontender to palpation throughout. Normoactiv bowel sounds.  Skin: Warm and dry.  Musculoskeletal: Surgical site exam deferred to orthopedic/surgery provider. Dorsiflexion and plantarflexion intact and symmetrical bilaterally. Lower extremity sensation intact and symmetrical bilaterally. No calf pain, negative Lynne's LLE. Aquacel covering left knee clean, dry, and intact.    Data   Recent Labs   Lab 01/23/25  0531 01/22/25  1239 01/22/25  1211   WBC  --   --  7.8   HGB 11.7  --  13.2   MCV  --   --  91   PLT  --   --  162   POTASSIUM  --   --  4.1   CR  --   --  0.85   GLC  --  99  --      Recent Labs   Lab 01/22/25  1239   GLC 99      Unresulted Labs Ordered in the Past 30 Days of this Admission       Date and Time Order Name Status Description    1/23/2025 12:22 AM Glucose In process            Imaging  Recent Results (from the past 24 hours)   XR Knee Port Left 1/2 Views    Narrative    EXAM: XR KNEE PORT LEFT 1/2 VIEWS  LOCATION: [facility]  DATE: 1/22/2025    INDICATION: Post Op Total Knee  COMPARISON: None.      Impression    IMPRESSION:     Expected postoperative changes status post recent total knee arthroplasty. Surgical drain in situ. No immediate hardware complication. No acute fracture or malalignment.        I reviewed all new labs and imaging results over  the last 24 hours. I personally reviewed no images or EKG's today.    Medications   Current Facility-Administered Medications   Medication Dose Route Frequency Provider Last Rate Last Admin    lactated ringers infusion   Intravenous Continuous Comfort, All LESLIE  mL/hr at 01/22/25 2033 New Bag at 01/22/25 2033     Current Facility-Administered Medications   Medication Dose Route Frequency Provider Last Rate Last Admin    acetaminophen (TYLENOL) tablet 975 mg  975 mg Oral Q8H Comfort, All LESLIE MD   975 mg at 01/23/25 0356    aspirin (ASA) EC tablet 325 mg  325 mg Oral Daily Comfort, All LESLIE MD   325 mg at 01/22/25 1759    atorvastatin (LIPITOR) tablet 40 mg  40 mg Oral QPM Emmanuel Prado PA-C   40 mg at 01/22/25 2028    ceFAZolin (ANCEF) 2 g vial to attach to  ml bag for ADULT or 50 ml bag for PEDS  2 g Intravenous Q8H Comfort, All LESLIE MD   2 g at 01/22/25 2342    famotidine (PEPCID) tablet 20 mg  20 mg Oral BID Comfort, All LESLIE MD   20 mg at 01/22/25 2029    Or    famotidine (PEPCID) injection 20 mg  20 mg Intravenous BID Comfort, All LESLIE MD        losartan (COZAAR) tablet 25 mg  25 mg Oral Daily Emmanuel Prado PA-C        metoprolol succinate ER (TOPROL XL) 24 hr tablet 100 mg  100 mg Oral Daily Emmanuel Prado PA-C        polyethylene glycol (MIRALAX) Packet 17 g  17 g Oral Daily Comfort, All LESLIE MD        ROPivacaine (NAROPIN) 5 MG/ mg, ketorolac (TORADOL) 30 mg, EPINEPHrine (ADRENALIN) 0.6 mg in sodium chloride 0.9 % 100 mL (ORTHO HAI STANDARD DOSE)   INTRA-ARTICULAR On Call to OR Cruz Vela PA-C        senna-docusate (SENOKOT-S/PERICOLACE) 8.6-50 MG per tablet 1 tablet  1 tablet Oral BID Fatuma, All LESLIE MD        sodium chloride (PF) 0.9% PF flush 3 mL  3 mL Intracatheter Q8H Comfort, MD EMMANUEL Levin ROBINSON, PA-C

## 2025-01-23 NOTE — PLAN OF CARE
Problem: Knee Arthroplasty  Goal: Optimal Coping  Outcome: Progressing  Goal: Absence of Bleeding  Outcome: Progressing  Goal: Effective Bowel Elimination  Outcome: Progressing  Goal: Fluid and Electrolyte Balance  Outcome: Progressing  Goal: Optimal Functional Ability  Outcome: Progressing  Intervention: Promote Optimal Functional Status  Recent Flowsheet Documentation  Taken 1/23/2025 0000 by Devick, Karen M, RN  Assistive Device Utilized:   walker   gait belt  Activity Management: activity adjusted per tolerance  Taken 1/22/2025 2000 by Devick, Karen M, RN  Assistive Device Utilized:   walker   gait belt  Activity Management: activity adjusted per tolerance  Goal: Absence of Infection Signs and Symptoms  Outcome: Progressing  Goal: Intact Neurovascular Status  Outcome: Progressing  Goal: Anesthesia/Sedation Recovery  Outcome: Progressing  Intervention: Optimize Anesthesia Recovery  Recent Flowsheet Documentation  Taken 1/23/2025 0000 by Devick, Karen M, RN  Safety Promotion/Fall Prevention:   activity supervised   clutter free environment maintained   mobility aid in reach   nonskid shoes/slippers when out of bed   supervised activity   safety round/check completed   room organization consistent   room near nurse's station  Administration (IS): proper technique demonstrated  Patient Tolerance (IS): good  Taken 1/22/2025 2000 by Devick, Karen M, RN  Safety Promotion/Fall Prevention:   activity supervised   clutter free environment maintained   mobility aid in reach   nonskid shoes/slippers when out of bed   supervised activity   safety round/check completed   room organization consistent   room near nurse's station  Administration (IS): proper technique demonstrated  Level Incentive Spirometer (mL): 2000  Number of Repetitions (IS): 2  Patient Tolerance (IS): good  Goal: Optimal Pain Control and Function  Outcome: Progressing  Goal: Nausea and Vomiting Relief  Outcome: Progressing  Goal: Effective Urinary  Elimination  Outcome: Progressing  Goal: Effective Oxygenation and Ventilation  Outcome: Progressing  Intervention: Optimize Oxygenation and Ventilation  Recent Flowsheet Documentation  Taken 1/23/2025 0000 by Devick, Karen M, RN  Cough And Deep Breathing: done independently per patient  Activity Management: activity adjusted per tolerance  Head of Bed (HOB) Positioning: HOB at 20-30 degrees  Taken 1/22/2025 2000 by Devick, Karen M, RN  Cough And Deep Breathing: done independently per patient  Activity Management: activity adjusted per tolerance  Head of Bed (HOB) Positioning: HOB at 20-30 degrees     Goal Outcome Evaluation: Patient vital signs are at baseline: Yes  Patient able to ambulate as they were prior to admission or with assist devices provided by therapies during their stay:  Yes  Patient MUST void prior to discharge:  Yes  Patient able to tolerate oral intake:  Yes  Pain has adequate pain control using Oral analgesics:  Yes  Does patient have an identified :  Yes  Has goal D/C date and time been discussed with patient:  Yes

## 2025-01-23 NOTE — PROGRESS NOTES
"[facility] Progress Note      Post-operative Day: 1 Day Post-Op    Procedure(s):  Total Knee Arthroplasty left   Subjective:    Pt reports mild pain in the left knee, it is well controlled on Tylenol. She hopes to avoid taking oxycodone as much as possible. She has passed PT and has outpatient physical therapy already scheduled.     Chest pain, SOB:  no  Nausea, vomiting:  no  Lightheadedness, dizziness:  no  Neuro:  Patient denies new onset numbness or paresthesias      Objective:  Blood pressure (!) 176/90, pulse 62, temperature 97.6  F (36.4  C), temperature source Oral, resp. rate 17, height 1.651 m (5' 5\"), weight 82.6 kg (182 lb), SpO2 96%.    Patient Vitals for the past 24 hrs:   BP Temp Temp src Pulse Resp SpO2 Height Weight   01/23/25 0818 (!) 176/90 97.6  F (36.4  C) Oral 62 17 96 % -- --   01/23/25 0401 (!) 155/84 97.5  F (36.4  C) Oral 68 -- 94 % -- --   01/23/25 0009 (!) 163/81 98  F (36.7  C) Oral 73 16 92 % -- --   01/22/25 2058 (!) 159/83 -- -- -- -- -- -- --   01/22/25 2021 -- 97.4  F (36.3  C) Axillary 76 15 95 % -- --   01/22/25 1745 (!) 155/80 -- -- -- -- 95 % -- --   01/22/25 1742 (!) 152/91 -- -- 84 -- -- -- --   01/22/25 1730 (!) 161/85 -- -- -- -- 95 % -- --   01/22/25 1708 -- 97.8  F (36.6  C) Oral 82 18 -- -- --   01/22/25 1700 (!) 148/77 98.3  F (36.8  C) Oral 84 16 95 % -- --   01/22/25 1645 139/79 -- -- 84 13 95 % -- --   01/22/25 1630 (!) 146/77 -- -- 90 18 94 % -- --   01/22/25 1615 138/74 -- -- 93 11 95 % -- --   01/22/25 1607 125/66 98.1  F (36.7  C) Axillary 93 12 93 % -- --   01/22/25 1254 (!) 164/74 -- -- -- -- -- -- --   01/22/25 1203 -- 97.5  F (36.4  C) Oral -- 16 98 % -- --   01/22/25 1153 -- -- -- -- -- -- 1.651 m (5' 5\") 82.6 kg (182 lb)       Wt Readings from Last 4 Encounters:   01/22/25 82.6 kg (182 lb)   07/23/23 82.6 kg (182 lb)   07/12/23 83 kg (183 lb)   04/10/11 87.1 kg (192 lb)       Gen: A&O x 3. NAD.   Wound status: Aquacel partially visualized, " intact.   Circulation, motion and sensation: Dorsiflexion/plantarflexion intact and equal bilaterally; distal lower extremity sensation is intact and equal bilaterally. Foot and toes are warm and well perfused.    Swelling: mild  Calf tenderness: calves are soft and non-tender bilaterally     Pertinent Labs   Lab Results: personally reviewed.     Recent Labs   Lab Test 01/23/25  0531 01/22/25  1211 07/23/23  0535 07/12/23  0403   WBC  --  7.8 8.7 7.6   HGB 11.7 13.2 14.0 12.8   HCT  --  39.0 43.2 38.8   MCV  --  91 94 92   PLT  --  162 169 153   NA  --   --  141 141       Plan:   Continue current cares and rehabilitation.  Anticoagulation protocol:  mg daily  x 42  days  Pain medications:  oxycodone, tylenol, and Robaxin  Weight bearing status:  WBAT  Disposition:  Plan for discharge to home with outpatient therapy when medically stable and pain is controlled, cleared by therapy. Later today.              Report completed by:  Reyes Montalvo PA-C  Date: 1/23/2025  Time: 8:59 AM
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
## 2025-01-23 NOTE — PLAN OF CARE
Occupational Therapy: Orders received. Chart reviewed and discussed with physical therapy,? Occupational Therapy not indicated as pt has no ADL concerns and family/friends to assist at discharge.? Defer discharge recommendations to Physical Therapy.? Will complete orders.

## 2025-01-23 NOTE — PROGRESS NOTES
"[facility] [place]  Data:   Reason for Transport:  Surgery completed    Antonia Gordon was transported from PACU via cart at 1730  .  Patient was accompanied by Nursing Assistant. Equipment used for transport: None. Family was aware of reason for transport: yes    Action:  Report: received from Geri DAMON    Response:  Patient's condition when transferred off unit was Stable.  BP (!) 148/77   Pulse 84   Temp 98.3  F (36.8  C) (Oral)   Resp 16   Ht 1.651 m (5' 5\")   Wt 82.6 kg (182 lb)   SpO2 95%   BMI 30.29 kg/m      Sisi Oglesby, RN  "

## (undated) DEVICE — SU VICRYL 2-0 CT-1 36" UND J945H

## (undated) DEVICE — Device

## (undated) DEVICE — GLOVE BIOGEL PI MICRO SZ 6.5 48565

## (undated) DEVICE — SOL NACL 0.9% IRRIG 3000ML BAG 07972-08

## (undated) DEVICE — BLADE SAW SAGITTAL STRK 18X90X1.27MM HD SYS 6 6118-127-090

## (undated) DEVICE — GLOVE BIOGEL PI MICRO INDICATOR UNDERGLOVE SZ 6.5 48965

## (undated) DEVICE — BNDG COBAN 6"X5YDS STERILE

## (undated) DEVICE — KIT DRAIN CLOSED WOUND SUCTION MED 400ML RESVR

## (undated) DEVICE — GOWN IMPERVIOUS SPECIALTY XLG/XLONG 32474

## (undated) DEVICE — DRSG AQUACEL AG 3.5X9.75" HYDROFIBER 412011

## (undated) DEVICE — NDL 18GA 1.5" 305196

## (undated) DEVICE — SUCTION TIP FLEXI CLEAR TIP DISP K62

## (undated) DEVICE — SUCTION MANIFOLD NEPTUNE 2 SYS 4 PORT 0702-020-000

## (undated) DEVICE — PREP CHLORAPREP 26ML TINTED HI-LITE ORANGE 930815

## (undated) DEVICE — DRAPE SHEET REV FOLD 3/4 9349

## (undated) DEVICE — BNDG ELASTIC 6" DBL LENGTH UNSTERILE 6611-16

## (undated) DEVICE — DRSG STERI STRIP 1/2X4" R1547

## (undated) DEVICE — SOL WATER IRRIG 1000ML BOTTLE 07139-09

## (undated) DEVICE — HOOD T4 PROTECTIVE STERI FACE SHIELD 400-800

## (undated) DEVICE — SU PDO 1 STRATAFIX 36X36CM CTX TAPERPOINT SXPD2B405

## (undated) DEVICE — STOCKING SLEEVE COMPRESSION CALF MED

## (undated) DEVICE — DRAPE POUCH INSTRUMENT 3 POCKET 1018L

## (undated) DEVICE — DRSG DRAIN 4X4" 7086

## (undated) DEVICE — DRAPE IOBAN LG .375X23.5" 6648EZ

## (undated) DEVICE — GLOVE BIOGEL PI MICRO SZ 8.0 48580

## (undated) DEVICE — GLOVE BIOGEL PI MICRO INDICATOR UNDERGLOVE SZ 7.5 48975

## (undated) DEVICE — TAPE MEDIPORE 4"X10YD 2964

## (undated) DEVICE — TOURNIQUET SGL  BLADDER 30" DL PORT BLUE 5921-030-235

## (undated) DEVICE — SU STRATAFIX MONOCRYL 3-0 SPIRAL PS-2 30CM SXMP1B106

## (undated) DEVICE — BONE CLEANING TIP INTERPULSE  0210-010-000

## (undated) DEVICE — DECANTER VIAL 2006S

## (undated) DEVICE — GLOVE BIOGEL PI MICRO SZ 7.0 48570

## (undated) DEVICE — SOL NACL 0.9% IRRIG 1000ML BOTTLE 07138-09

## (undated) DEVICE — GOWN LG DISP 9515

## (undated) DEVICE — BONE CEMENT MIXEVAC III HI VAC KIT  0206-015-000

## (undated) DEVICE — GLOVE BIOGEL PI MICRO INDICATOR UNDERGLOVE SZ 8.5 48985

## (undated) DEVICE — DRAPE STERI U 1015

## (undated) DEVICE — SUCTION IRR SYSTEM W/TIP INTERPULSE

## (undated) DEVICE — SYR 50ML LL W/O NDL 309653

## (undated) DEVICE — ESU PENCIL SMOKE EVAC W/ROCKER SWITCH 0703-047-000

## (undated) RX ORDER — ONDANSETRON 2 MG/ML
INJECTION INTRAMUSCULAR; INTRAVENOUS
Status: DISPENSED
Start: 2025-01-22

## (undated) RX ORDER — ACETAMINOPHEN 325 MG/1
TABLET ORAL
Status: DISPENSED
Start: 2025-01-22

## (undated) RX ORDER — LIDOCAINE HYDROCHLORIDE 10 MG/ML
INJECTION, SOLUTION EPIDURAL; INFILTRATION; INTRACAUDAL; PERINEURAL
Status: DISPENSED
Start: 2025-01-22

## (undated) RX ORDER — GLYCOPYRROLATE 0.2 MG/ML
INJECTION, SOLUTION INTRAMUSCULAR; INTRAVENOUS
Status: DISPENSED
Start: 2025-01-22

## (undated) RX ORDER — DEXAMETHASONE SODIUM PHOSPHATE 4 MG/ML
INJECTION, SOLUTION INTRA-ARTICULAR; INTRALESIONAL; INTRAMUSCULAR; INTRAVENOUS; SOFT TISSUE
Status: DISPENSED
Start: 2025-01-22

## (undated) RX ORDER — FENTANYL CITRATE 50 UG/ML
INJECTION, SOLUTION INTRAMUSCULAR; INTRAVENOUS
Status: DISPENSED
Start: 2025-01-22

## (undated) RX ORDER — TRANEXAMIC ACID 650 MG/1
TABLET ORAL
Status: DISPENSED
Start: 2025-01-22

## (undated) RX ORDER — PROPOFOL 10 MG/ML
INJECTION, EMULSION INTRAVENOUS
Status: DISPENSED
Start: 2025-01-22

## (undated) RX ORDER — CEFAZOLIN SODIUM/WATER 2 G/20 ML
SYRINGE (ML) INTRAVENOUS
Status: DISPENSED
Start: 2025-01-22